# Patient Record
Sex: FEMALE | Race: WHITE | Employment: OTHER | URBAN - METROPOLITAN AREA
[De-identification: names, ages, dates, MRNs, and addresses within clinical notes are randomized per-mention and may not be internally consistent; named-entity substitution may affect disease eponyms.]

---

## 2018-11-19 ENCOUNTER — OFFICE VISIT (OUTPATIENT)
Dept: FAMILY MEDICINE CLINIC | Age: 53
End: 2018-11-19
Payer: MEDICARE

## 2018-11-19 VITALS
HEART RATE: 105 BPM | DIASTOLIC BLOOD PRESSURE: 108 MMHG | WEIGHT: 149 LBS | SYSTOLIC BLOOD PRESSURE: 148 MMHG | OXYGEN SATURATION: 99 % | TEMPERATURE: 99.3 F | BODY MASS INDEX: 27.42 KG/M2 | HEIGHT: 62 IN

## 2018-11-19 DIAGNOSIS — Z23 NEED FOR SHINGLES VACCINE: ICD-10-CM

## 2018-11-19 DIAGNOSIS — Z12.39 BREAST CANCER SCREENING: ICD-10-CM

## 2018-11-19 DIAGNOSIS — L98.9 FACIAL SKIN LESION: ICD-10-CM

## 2018-11-19 DIAGNOSIS — Z13.29 THYROID DISORDER SCREENING: ICD-10-CM

## 2018-11-19 DIAGNOSIS — F31.9 BIPOLAR DISEASE, CHRONIC (HCC): ICD-10-CM

## 2018-11-19 DIAGNOSIS — Z12.11 COLON CANCER SCREENING: ICD-10-CM

## 2018-11-19 DIAGNOSIS — Z13.220 SCREENING CHOLESTEROL LEVEL: Primary | ICD-10-CM

## 2018-11-19 DIAGNOSIS — F33.1 MODERATE EPISODE OF RECURRENT MAJOR DEPRESSIVE DISORDER (HCC): ICD-10-CM

## 2018-11-19 DIAGNOSIS — I10 ESSENTIAL HYPERTENSION: ICD-10-CM

## 2018-11-19 DIAGNOSIS — F43.10 PTSD (POST-TRAUMATIC STRESS DISORDER): ICD-10-CM

## 2018-11-19 LAB
A/G RATIO: 1.8 (ref 1.1–2.2)
ALBUMIN SERPL-MCNC: 5 G/DL (ref 3.4–5)
ALP BLD-CCNC: 95 U/L (ref 40–129)
ALT SERPL-CCNC: 15 U/L (ref 10–40)
ANION GAP SERPL CALCULATED.3IONS-SCNC: 20 MMOL/L (ref 3–16)
AST SERPL-CCNC: 17 U/L (ref 15–37)
BASOPHILS ABSOLUTE: 0 K/UL (ref 0–0.2)
BASOPHILS RELATIVE PERCENT: 0.2 %
BILIRUB SERPL-MCNC: 0.3 MG/DL (ref 0–1)
BUN BLDV-MCNC: 9 MG/DL (ref 7–20)
CALCIUM SERPL-MCNC: 10 MG/DL (ref 8.3–10.6)
CHLORIDE BLD-SCNC: 98 MMOL/L (ref 99–110)
CHOLESTEROL, TOTAL: 237 MG/DL (ref 0–199)
CO2: 22 MMOL/L (ref 21–32)
CREAT SERPL-MCNC: 0.7 MG/DL (ref 0.6–1.1)
EOSINOPHILS ABSOLUTE: 0.1 K/UL (ref 0–0.6)
EOSINOPHILS RELATIVE PERCENT: 0.8 %
GFR AFRICAN AMERICAN: >60
GFR NON-AFRICAN AMERICAN: >60
GLOBULIN: 2.8 G/DL
GLUCOSE BLD-MCNC: 95 MG/DL (ref 70–99)
HCT VFR BLD CALC: 42.5 % (ref 36–48)
HDLC SERPL-MCNC: 49 MG/DL (ref 40–60)
HEMOGLOBIN: 14.5 G/DL (ref 12–16)
LDL CHOLESTEROL CALCULATED: ABNORMAL MG/DL
LDL CHOLESTEROL DIRECT: 143 MG/DL
LYMPHOCYTES ABSOLUTE: 1.3 K/UL (ref 1–5.1)
LYMPHOCYTES RELATIVE PERCENT: 13.6 %
MCH RBC QN AUTO: 32.1 PG (ref 26–34)
MCHC RBC AUTO-ENTMCNC: 34.1 G/DL (ref 31–36)
MCV RBC AUTO: 94.2 FL (ref 80–100)
MONOCYTES ABSOLUTE: 0.8 K/UL (ref 0–1.3)
MONOCYTES RELATIVE PERCENT: 8.3 %
NEUTROPHILS ABSOLUTE: 7.2 K/UL (ref 1.7–7.7)
NEUTROPHILS RELATIVE PERCENT: 77.1 %
PDW BLD-RTO: 12.9 % (ref 12.4–15.4)
PLATELET # BLD: 277 K/UL (ref 135–450)
PMV BLD AUTO: 9 FL (ref 5–10.5)
POTASSIUM SERPL-SCNC: 4.1 MMOL/L (ref 3.5–5.1)
RBC # BLD: 4.51 M/UL (ref 4–5.2)
SODIUM BLD-SCNC: 140 MMOL/L (ref 136–145)
T4 FREE: 1.1 NG/DL (ref 0.9–1.8)
TOTAL PROTEIN: 7.8 G/DL (ref 6.4–8.2)
TRIGL SERPL-MCNC: 519 MG/DL (ref 0–150)
TSH REFLEX: 4.88 UIU/ML (ref 0.27–4.2)
VLDLC SERPL CALC-MCNC: ABNORMAL MG/DL
WBC # BLD: 9.3 K/UL (ref 4–11)

## 2018-11-19 PROCEDURE — 36415 COLL VENOUS BLD VENIPUNCTURE: CPT | Performed by: NURSE PRACTITIONER

## 2018-11-19 PROCEDURE — G8484 FLU IMMUNIZE NO ADMIN: HCPCS | Performed by: NURSE PRACTITIONER

## 2018-11-19 PROCEDURE — 4004F PT TOBACCO SCREEN RCVD TLK: CPT | Performed by: NURSE PRACTITIONER

## 2018-11-19 PROCEDURE — 3017F COLORECTAL CA SCREEN DOC REV: CPT | Performed by: NURSE PRACTITIONER

## 2018-11-19 PROCEDURE — G8427 DOCREV CUR MEDS BY ELIG CLIN: HCPCS | Performed by: NURSE PRACTITIONER

## 2018-11-19 PROCEDURE — G8419 CALC BMI OUT NRM PARAM NOF/U: HCPCS | Performed by: NURSE PRACTITIONER

## 2018-11-19 PROCEDURE — 99203 OFFICE O/P NEW LOW 30 MIN: CPT | Performed by: NURSE PRACTITIONER

## 2018-11-19 RX ORDER — LISINOPRIL 10 MG/1
10 TABLET ORAL DAILY
Qty: 30 TABLET | Refills: 0 | Status: SHIPPED | OUTPATIENT
Start: 2018-11-19 | End: 2018-12-16 | Stop reason: SDUPTHER

## 2018-11-19 RX ORDER — QUETIAPINE FUMARATE 300 MG/1
100 TABLET, FILM COATED ORAL DAILY
Status: ON HOLD | COMMUNITY
End: 2019-04-01

## 2018-11-19 RX ORDER — VENLAFAXINE 75 MG/1
75 TABLET ORAL 2 TIMES DAILY
COMMUNITY
End: 2019-04-26 | Stop reason: ALTCHOICE

## 2018-11-19 NOTE — PATIENT INSTRUCTIONS
Please read the healthy family handout that you were given and share it with your family. Please compare this printed medication list with your medications at home to be sure they are the same. If you have any medications that are different please contact us immediately at [phone]. Also review your allergies that we have listed, these may also include medications that you have not been able to tolerate, make sure everything listed is correct. If you have any allergies that are different please contact us immediately at [phone]. Patient Education        Stopping Smoking: Care Instructions  Your Care Instructions  Cigarette smokers crave the nicotine in cigarettes. Giving it up is much harder than simply changing a habit. Your body has to stop craving the nicotine. It is hard to quit, but you can do it. There are many tools that people use to quit smoking. You may find that combining tools works best for you. There are several steps to quitting. First you get ready to quit. Then you get support to help you. After that, you learn new skills and behaviors to become a nonsmoker. For many people, a necessary step is getting and using medicine. Your doctor will help you set up the plan that best meets your needs. You may want to attend a smoking cessation program to help you quit smoking. When you choose a program, look for one that has proven success. Ask your doctor for ideas. You will greatly increase your chances of success if you take medicine as well as get counseling or join a cessation program.  Some of the changes you feel when you first quit tobacco are uncomfortable. Your body will miss the nicotine at first, and you may feel short-tempered and grumpy. You may have trouble sleeping or concentrating. Medicine can help you deal with these symptoms. You may struggle with changing your smoking habits and rituals. The last step is the tricky one:  Be prepared for the smoking urge to continue for a

## 2018-12-03 ENCOUNTER — TELEPHONE (OUTPATIENT)
Dept: FAMILY MEDICINE CLINIC | Age: 53
End: 2018-12-03

## 2018-12-03 ENCOUNTER — NURSE ONLY (OUTPATIENT)
Dept: FAMILY MEDICINE CLINIC | Age: 53
End: 2018-12-03

## 2018-12-16 DIAGNOSIS — I10 ESSENTIAL HYPERTENSION: ICD-10-CM

## 2018-12-17 RX ORDER — LISINOPRIL 10 MG/1
TABLET ORAL
Qty: 30 TABLET | Refills: 5 | Status: SHIPPED | OUTPATIENT
Start: 2018-12-17 | End: 2019-07-03 | Stop reason: SDUPTHER

## 2019-03-05 ENCOUNTER — TELEPHONE (OUTPATIENT)
Dept: FAMILY MEDICINE CLINIC | Age: 54
End: 2019-03-05

## 2019-03-19 ENCOUNTER — OFFICE VISIT (OUTPATIENT)
Dept: FAMILY MEDICINE CLINIC | Age: 54
End: 2019-03-19
Payer: MEDICARE

## 2019-03-19 VITALS
DIASTOLIC BLOOD PRESSURE: 78 MMHG | SYSTOLIC BLOOD PRESSURE: 112 MMHG | TEMPERATURE: 98.1 F | OXYGEN SATURATION: 100 % | WEIGHT: 149 LBS | HEART RATE: 123 BPM | BODY MASS INDEX: 27.7 KG/M2

## 2019-03-19 DIAGNOSIS — E78.2 MIXED HYPERLIPIDEMIA: ICD-10-CM

## 2019-03-19 DIAGNOSIS — I10 ESSENTIAL HYPERTENSION: Primary | ICD-10-CM

## 2019-03-19 DIAGNOSIS — R53.83 FATIGUE, UNSPECIFIED TYPE: ICD-10-CM

## 2019-03-19 DIAGNOSIS — R00.2 PALPITATIONS: ICD-10-CM

## 2019-03-19 LAB
ANION GAP SERPL CALCULATED.3IONS-SCNC: 17 MMOL/L (ref 3–16)
BASOPHILS ABSOLUTE: 0 K/UL (ref 0–0.2)
BASOPHILS RELATIVE PERCENT: 0.3 %
BUN BLDV-MCNC: 15 MG/DL (ref 7–20)
CALCIUM SERPL-MCNC: 11.1 MG/DL (ref 8.3–10.6)
CHLORIDE BLD-SCNC: 96 MMOL/L (ref 99–110)
CHOLESTEROL, TOTAL: 222 MG/DL (ref 0–199)
CO2: 26 MMOL/L (ref 21–32)
CREAT SERPL-MCNC: 1.1 MG/DL (ref 0.6–1.1)
EOSINOPHILS ABSOLUTE: 0.1 K/UL (ref 0–0.6)
EOSINOPHILS RELATIVE PERCENT: 1.4 %
GFR AFRICAN AMERICAN: >60
GFR NON-AFRICAN AMERICAN: 52
GLUCOSE BLD-MCNC: 99 MG/DL (ref 70–99)
HCT VFR BLD CALC: 42 % (ref 36–48)
HDLC SERPL-MCNC: 45 MG/DL (ref 40–60)
HEMOGLOBIN: 14 G/DL (ref 12–16)
LDL CHOLESTEROL CALCULATED: ABNORMAL MG/DL
LDL CHOLESTEROL DIRECT: 143 MG/DL
LYMPHOCYTES ABSOLUTE: 1.4 K/UL (ref 1–5.1)
LYMPHOCYTES RELATIVE PERCENT: 19.1 %
MCH RBC QN AUTO: 31.3 PG (ref 26–34)
MCHC RBC AUTO-ENTMCNC: 33.4 G/DL (ref 31–36)
MCV RBC AUTO: 93.8 FL (ref 80–100)
MONOCYTES ABSOLUTE: 0.7 K/UL (ref 0–1.3)
MONOCYTES RELATIVE PERCENT: 9 %
NEUTROPHILS ABSOLUTE: 5.3 K/UL (ref 1.7–7.7)
NEUTROPHILS RELATIVE PERCENT: 70.2 %
PDW BLD-RTO: 13.6 % (ref 12.4–15.4)
PLATELET # BLD: 368 K/UL (ref 135–450)
PMV BLD AUTO: 9 FL (ref 5–10.5)
POTASSIUM SERPL-SCNC: 4.6 MMOL/L (ref 3.5–5.1)
RBC # BLD: 4.47 M/UL (ref 4–5.2)
SODIUM BLD-SCNC: 139 MMOL/L (ref 136–145)
T4 FREE: 1 NG/DL (ref 0.9–1.8)
TRIGL SERPL-MCNC: 363 MG/DL (ref 0–150)
TSH REFLEX: 4.6 UIU/ML (ref 0.27–4.2)
VLDLC SERPL CALC-MCNC: ABNORMAL MG/DL
WBC # BLD: 7.5 K/UL (ref 4–11)

## 2019-03-19 PROCEDURE — 99214 OFFICE O/P EST MOD 30 MIN: CPT | Performed by: NURSE PRACTITIONER

## 2019-03-19 PROCEDURE — 93000 ELECTROCARDIOGRAM COMPLETE: CPT | Performed by: NURSE PRACTITIONER

## 2019-03-19 PROCEDURE — G8419 CALC BMI OUT NRM PARAM NOF/U: HCPCS | Performed by: NURSE PRACTITIONER

## 2019-03-19 PROCEDURE — G8484 FLU IMMUNIZE NO ADMIN: HCPCS | Performed by: NURSE PRACTITIONER

## 2019-03-19 PROCEDURE — 4004F PT TOBACCO SCREEN RCVD TLK: CPT | Performed by: NURSE PRACTITIONER

## 2019-03-19 PROCEDURE — 36415 COLL VENOUS BLD VENIPUNCTURE: CPT | Performed by: NURSE PRACTITIONER

## 2019-03-19 PROCEDURE — 3017F COLORECTAL CA SCREEN DOC REV: CPT | Performed by: NURSE PRACTITIONER

## 2019-03-19 PROCEDURE — G8427 DOCREV CUR MEDS BY ELIG CLIN: HCPCS | Performed by: NURSE PRACTITIONER

## 2019-03-19 RX ORDER — MIRTAZAPINE 7.5 MG/1
TABLET, FILM COATED ORAL
Refills: 2 | COMMUNITY
Start: 2019-03-01 | End: 2019-04-26 | Stop reason: ALTCHOICE

## 2019-03-20 DIAGNOSIS — E03.9 HYPOTHYROIDISM, UNSPECIFIED TYPE: Primary | ICD-10-CM

## 2019-03-20 RX ORDER — LEVOTHYROXINE SODIUM 0.03 MG/1
25 TABLET ORAL DAILY
Qty: 30 TABLET | Refills: 5 | Status: SHIPPED | OUTPATIENT
Start: 2019-03-20

## 2019-03-28 ENCOUNTER — TELEPHONE (OUTPATIENT)
Dept: CARDIOLOGY CLINIC | Age: 54
End: 2019-03-28

## 2019-03-28 ENCOUNTER — OFFICE VISIT (OUTPATIENT)
Dept: CARDIOLOGY CLINIC | Age: 54
End: 2019-03-28
Payer: MEDICARE

## 2019-03-28 VITALS
HEIGHT: 62 IN | SYSTOLIC BLOOD PRESSURE: 111 MMHG | BODY MASS INDEX: 27.75 KG/M2 | WEIGHT: 150.8 LBS | OXYGEN SATURATION: 99 % | HEART RATE: 83 BPM | DIASTOLIC BLOOD PRESSURE: 76 MMHG

## 2019-03-28 DIAGNOSIS — R00.2 PALPITATIONS: ICD-10-CM

## 2019-03-28 DIAGNOSIS — R42 DIZZINESS: ICD-10-CM

## 2019-03-28 DIAGNOSIS — R07.2 PRECORDIAL PAIN: ICD-10-CM

## 2019-03-28 PROCEDURE — 99204 OFFICE O/P NEW MOD 45 MIN: CPT | Performed by: INTERNAL MEDICINE

## 2019-03-28 PROCEDURE — G8419 CALC BMI OUT NRM PARAM NOF/U: HCPCS | Performed by: INTERNAL MEDICINE

## 2019-03-28 PROCEDURE — G8484 FLU IMMUNIZE NO ADMIN: HCPCS | Performed by: INTERNAL MEDICINE

## 2019-03-28 PROCEDURE — 3017F COLORECTAL CA SCREEN DOC REV: CPT | Performed by: INTERNAL MEDICINE

## 2019-03-28 PROCEDURE — G8427 DOCREV CUR MEDS BY ELIG CLIN: HCPCS | Performed by: INTERNAL MEDICINE

## 2019-03-28 RX ORDER — QUETIAPINE FUMARATE 50 MG/1
200 TABLET, FILM COATED ORAL NIGHTLY
Status: ON HOLD | COMMUNITY
End: 2019-04-01

## 2019-03-28 NOTE — TELEPHONE ENCOUNTER
Patient seen at the [place] office to day.  Please call patient to schedule stress echocardiogram.

## 2019-03-29 ENCOUNTER — TELEPHONE (OUTPATIENT)
Dept: CARDIOLOGY CLINIC | Age: 54
End: 2019-03-29

## 2019-03-31 ENCOUNTER — HOSPITAL ENCOUNTER (INPATIENT)
Age: 54
LOS: 1 days | Discharge: HOME | DRG: 854 | End: 2019-04-02
Attending: EMERGENCY MEDICINE | Admitting: INTERNAL MEDICINE
Payer: MEDICARE

## 2019-03-31 ENCOUNTER — APPOINTMENT (OUTPATIENT)
Dept: CT IMAGING | Age: 54
DRG: 854 | End: 2019-03-31
Payer: MEDICARE

## 2019-03-31 DIAGNOSIS — R10.9 RIGHT FLANK PAIN: ICD-10-CM

## 2019-03-31 DIAGNOSIS — N17.9 AKI (ACUTE KIDNEY INJURY) (HCC): ICD-10-CM

## 2019-03-31 DIAGNOSIS — N13.30 HYDRONEPHROSIS, UNSPECIFIED HYDRONEPHROSIS TYPE: ICD-10-CM

## 2019-03-31 DIAGNOSIS — N13.2 HYDRONEPHROSIS WITH URINARY OBSTRUCTION DUE TO URETERAL CALCULUS: ICD-10-CM

## 2019-03-31 DIAGNOSIS — N20.0 KIDNEY STONE: ICD-10-CM

## 2019-03-31 DIAGNOSIS — A41.9 SEPTICEMIA (HCC): Primary | ICD-10-CM

## 2019-03-31 LAB
A/G RATIO: 1 (ref 1.1–2.2)
ALBUMIN SERPL-MCNC: 3.4 G/DL (ref 3.4–5)
ALP BLD-CCNC: 122 U/L (ref 40–129)
ALT SERPL-CCNC: 45 U/L (ref 10–40)
ANION GAP SERPL CALCULATED.3IONS-SCNC: 17 MMOL/L (ref 3–16)
ANISOCYTOSIS: ABNORMAL
AST SERPL-CCNC: 86 U/L (ref 15–37)
BANDED NEUTROPHILS RELATIVE PERCENT: 11 % (ref 0–7)
BASOPHILS ABSOLUTE: 0 K/UL (ref 0–0.2)
BASOPHILS RELATIVE PERCENT: 0 %
BILIRUB SERPL-MCNC: 0.9 MG/DL (ref 0–1)
BILIRUBIN URINE: NEGATIVE
BLOOD, URINE: ABNORMAL
BUN BLDV-MCNC: 17 MG/DL (ref 7–20)
CALCIUM SERPL-MCNC: 8.7 MG/DL (ref 8.3–10.6)
CHLORIDE BLD-SCNC: 97 MMOL/L (ref 99–110)
CLARITY: CLEAR
CO2: 21 MMOL/L (ref 21–32)
COLOR: YELLOW
CREAT SERPL-MCNC: 1.4 MG/DL (ref 0.6–1.1)
EOSINOPHILS ABSOLUTE: 0 K/UL (ref 0–0.6)
EOSINOPHILS RELATIVE PERCENT: 0 %
GFR AFRICAN AMERICAN: 48
GFR NON-AFRICAN AMERICAN: 40
GLOBULIN: 3.3 G/DL
GLUCOSE BLD-MCNC: 124 MG/DL (ref 70–99)
GLUCOSE URINE: NEGATIVE MG/DL
HCT VFR BLD CALC: 41.9 % (ref 36–48)
HEMOGLOBIN: 14.3 G/DL (ref 12–16)
KETONES, URINE: NEGATIVE MG/DL
LEUKOCYTE ESTERASE, URINE: ABNORMAL
LYMPHOCYTES ABSOLUTE: 0.4 K/UL (ref 1–5.1)
LYMPHOCYTES RELATIVE PERCENT: 2 %
MCH RBC QN AUTO: 30.9 PG (ref 26–34)
MCHC RBC AUTO-ENTMCNC: 34.1 G/DL (ref 31–36)
MCV RBC AUTO: 90.8 FL (ref 80–100)
METAMYELOCYTES RELATIVE PERCENT: 6 %
MICROSCOPIC EXAMINATION: YES
MONOCYTES ABSOLUTE: 0.6 K/UL (ref 0–1.3)
MONOCYTES RELATIVE PERCENT: 3 %
MYELOCYTE PERCENT: 1 %
NEUTROPHILS ABSOLUTE: 18.6 K/UL (ref 1.7–7.7)
NEUTROPHILS RELATIVE PERCENT: 77 %
NITRITE, URINE: NEGATIVE
PDW BLD-RTO: 14.1 % (ref 12.4–15.4)
PH UA: 6.5 (ref 5–8)
PLATELET # BLD: 198 K/UL (ref 135–450)
PMV BLD AUTO: 7.9 FL (ref 5–10.5)
POIKILOCYTES: ABNORMAL
POTASSIUM SERPL-SCNC: 3.7 MMOL/L (ref 3.5–5.1)
PROTEIN UA: 100 MG/DL
RBC # BLD: 4.61 M/UL (ref 4–5.2)
RBC UA: ABNORMAL /HPF (ref 0–2)
SLIDE REVIEW: ABNORMAL
SODIUM BLD-SCNC: 135 MMOL/L (ref 136–145)
SPECIFIC GRAVITY UA: 1.02 (ref 1–1.03)
TOTAL PROTEIN: 6.7 G/DL (ref 6.4–8.2)
URINE REFLEX TO CULTURE: YES
URINE TYPE: ABNORMAL
UROBILINOGEN, URINE: 0.2 E.U./DL
WBC # BLD: 19.6 K/UL (ref 4–11)
WBC UA: >100 /HPF (ref 0–5)

## 2019-03-31 PROCEDURE — 99285 EMERGENCY DEPT VISIT HI MDM: CPT

## 2019-03-31 PROCEDURE — 6360000002 HC RX W HCPCS: Performed by: PHYSICIAN ASSISTANT

## 2019-03-31 PROCEDURE — 96375 TX/PRO/DX INJ NEW DRUG ADDON: CPT

## 2019-03-31 PROCEDURE — 87186 SC STD MICRODIL/AGAR DIL: CPT

## 2019-03-31 PROCEDURE — 2500000003 HC RX 250 WO HCPCS: Performed by: PHYSICIAN ASSISTANT

## 2019-03-31 PROCEDURE — 96365 THER/PROPH/DIAG IV INF INIT: CPT

## 2019-03-31 PROCEDURE — 85025 COMPLETE CBC W/AUTO DIFF WBC: CPT

## 2019-03-31 PROCEDURE — 74176 CT ABD & PELVIS W/O CONTRAST: CPT

## 2019-03-31 PROCEDURE — 96361 HYDRATE IV INFUSION ADD-ON: CPT

## 2019-03-31 PROCEDURE — 87077 CULTURE AEROBIC IDENTIFY: CPT

## 2019-03-31 PROCEDURE — 80053 COMPREHEN METABOLIC PANEL: CPT

## 2019-03-31 PROCEDURE — 87086 URINE CULTURE/COLONY COUNT: CPT

## 2019-03-31 PROCEDURE — 81001 URINALYSIS AUTO W/SCOPE: CPT

## 2019-03-31 PROCEDURE — 2580000003 HC RX 258: Performed by: PHYSICIAN ASSISTANT

## 2019-03-31 RX ORDER — 0.9 % SODIUM CHLORIDE 0.9 %
1000 INTRAVENOUS SOLUTION INTRAVENOUS ONCE
Status: COMPLETED | OUTPATIENT
Start: 2019-03-31 | End: 2019-03-31

## 2019-03-31 RX ORDER — 0.9 % SODIUM CHLORIDE 0.9 %
1000 INTRAVENOUS SOLUTION INTRAVENOUS ONCE
Status: COMPLETED | OUTPATIENT
Start: 2019-03-31 | End: 2019-04-01

## 2019-03-31 RX ORDER — KETOROLAC TROMETHAMINE 30 MG/ML
30 INJECTION, SOLUTION INTRAMUSCULAR; INTRAVENOUS ONCE
Status: COMPLETED | OUTPATIENT
Start: 2019-03-31 | End: 2019-03-31

## 2019-03-31 RX ORDER — ONDANSETRON 2 MG/ML
4 INJECTION INTRAMUSCULAR; INTRAVENOUS ONCE
Status: COMPLETED | OUTPATIENT
Start: 2019-03-31 | End: 2019-03-31

## 2019-03-31 RX ADMIN — CEFTRIAXONE SODIUM 1 G: 1 INJECTION, POWDER, FOR SOLUTION INTRAMUSCULAR; INTRAVENOUS at 23:58

## 2019-03-31 RX ADMIN — FAMOTIDINE 20 MG: 10 INJECTION, SOLUTION INTRAVENOUS at 22:40

## 2019-03-31 RX ADMIN — ONDANSETRON 4 MG: 2 INJECTION INTRAMUSCULAR; INTRAVENOUS at 22:40

## 2019-03-31 RX ADMIN — KETOROLAC TROMETHAMINE 30 MG: 30 INJECTION, SOLUTION INTRAMUSCULAR; INTRAVENOUS at 22:40

## 2019-03-31 RX ADMIN — SODIUM CHLORIDE 1000 ML: 9 INJECTION, SOLUTION INTRAVENOUS at 22:10

## 2019-03-31 RX ADMIN — SODIUM CHLORIDE 1000 ML: 9 INJECTION, SOLUTION INTRAVENOUS at 22:40

## 2019-03-31 ASSESSMENT — PAIN DESCRIPTION - PAIN TYPE: TYPE: ACUTE PAIN

## 2019-03-31 ASSESSMENT — PAIN DESCRIPTION - FREQUENCY: FREQUENCY: CONTINUOUS

## 2019-03-31 ASSESSMENT — PAIN DESCRIPTION - DESCRIPTORS: DESCRIPTORS: ACHING

## 2019-03-31 ASSESSMENT — PAIN DESCRIPTION - LOCATION: LOCATION: FLANK

## 2019-03-31 ASSESSMENT — PAIN SCALES - GENERAL: PAINLEVEL_OUTOF10: 9

## 2019-03-31 ASSESSMENT — PAIN DESCRIPTION - ORIENTATION: ORIENTATION: RIGHT

## 2019-04-01 ENCOUNTER — APPOINTMENT (OUTPATIENT)
Dept: GENERAL RADIOLOGY | Age: 54
DRG: 854 | End: 2019-04-01
Payer: MEDICARE

## 2019-04-01 ENCOUNTER — ANESTHESIA (OUTPATIENT)
Dept: OPERATING ROOM | Age: 54
DRG: 854 | End: 2019-04-01
Payer: MEDICARE

## 2019-04-01 ENCOUNTER — ANESTHESIA EVENT (OUTPATIENT)
Dept: OPERATING ROOM | Age: 54
DRG: 854 | End: 2019-04-01
Payer: MEDICARE

## 2019-04-01 VITALS
RESPIRATION RATE: 13 BRPM | SYSTOLIC BLOOD PRESSURE: 92 MMHG | OXYGEN SATURATION: 100 % | DIASTOLIC BLOOD PRESSURE: 68 MMHG

## 2019-04-01 PROBLEM — N13.2 HYDRONEPHROSIS WITH URINARY OBSTRUCTION DUE TO URETERAL CALCULUS: Status: ACTIVE | Noted: 2019-04-01

## 2019-04-01 LAB
A/G RATIO: 0.9 (ref 1.1–2.2)
ALBUMIN SERPL-MCNC: 2.6 G/DL (ref 3.4–5)
ALP BLD-CCNC: 102 U/L (ref 40–129)
ALT SERPL-CCNC: 39 U/L (ref 10–40)
ANION GAP SERPL CALCULATED.3IONS-SCNC: 13 MMOL/L (ref 3–16)
AST SERPL-CCNC: 72 U/L (ref 15–37)
BILIRUB SERPL-MCNC: 0.4 MG/DL (ref 0–1)
BUN BLDV-MCNC: 19 MG/DL (ref 7–20)
CALCIUM SERPL-MCNC: 6.9 MG/DL (ref 8.3–10.6)
CHLORIDE BLD-SCNC: 106 MMOL/L (ref 99–110)
CO2: 19 MMOL/L (ref 21–32)
CREAT SERPL-MCNC: 1.2 MG/DL (ref 0.6–1.1)
GFR AFRICAN AMERICAN: 57
GFR NON-AFRICAN AMERICAN: 47
GLOBULIN: 2.8 G/DL
GLUCOSE BLD-MCNC: 115 MG/DL (ref 70–99)
INR BLD: 1.65 (ref 0.86–1.14)
LACTIC ACID: 2.1 MMOL/L (ref 0.4–2)
LACTIC ACID: 2.7 MMOL/L (ref 0.4–2)
LACTIC ACID: 2.7 MMOL/L (ref 0.4–2)
POTASSIUM REFLEX MAGNESIUM: 4.1 MMOL/L (ref 3.5–5.1)
PROTHROMBIN TIME: 18.8 SEC (ref 9.8–13)
SODIUM BLD-SCNC: 138 MMOL/L (ref 136–145)
TOTAL PROTEIN: 5.4 G/DL (ref 6.4–8.2)

## 2019-04-01 PROCEDURE — 6360000002 HC RX W HCPCS: Performed by: NURSE ANESTHETIST, CERTIFIED REGISTERED

## 2019-04-01 PROCEDURE — 6360000002 HC RX W HCPCS: Performed by: UROLOGY

## 2019-04-01 PROCEDURE — C1769 GUIDE WIRE: HCPCS | Performed by: UROLOGY

## 2019-04-01 PROCEDURE — 2580000003 HC RX 258: Performed by: NURSE ANESTHETIST, CERTIFIED REGISTERED

## 2019-04-01 PROCEDURE — 3600000004 HC SURGERY LEVEL 4 BASE: Performed by: UROLOGY

## 2019-04-01 PROCEDURE — C1773 RET DEV, INSERTABLE: HCPCS | Performed by: UROLOGY

## 2019-04-01 PROCEDURE — 6360000002 HC RX W HCPCS: Performed by: ANESTHESIOLOGY

## 2019-04-01 PROCEDURE — 1200000000 HC SEMI PRIVATE

## 2019-04-01 PROCEDURE — 2500000003 HC RX 250 WO HCPCS: Performed by: NURSE ANESTHETIST, CERTIFIED REGISTERED

## 2019-04-01 PROCEDURE — 6370000000 HC RX 637 (ALT 250 FOR IP): Performed by: INTERNAL MEDICINE

## 2019-04-01 PROCEDURE — 3700000000 HC ANESTHESIA ATTENDED CARE: Performed by: UROLOGY

## 2019-04-01 PROCEDURE — 7100000000 HC PACU RECOVERY - FIRST 15 MIN: Performed by: UROLOGY

## 2019-04-01 PROCEDURE — 3600000014 HC SURGERY LEVEL 4 ADDTL 15MIN: Performed by: UROLOGY

## 2019-04-01 PROCEDURE — 6360000002 HC RX W HCPCS: Performed by: PHYSICIAN ASSISTANT

## 2019-04-01 PROCEDURE — 83605 ASSAY OF LACTIC ACID: CPT

## 2019-04-01 PROCEDURE — 87040 BLOOD CULTURE FOR BACTERIA: CPT

## 2019-04-01 PROCEDURE — 74420 UROGRAPHY RTRGR +-KUB: CPT

## 2019-04-01 PROCEDURE — 6370000000 HC RX 637 (ALT 250 FOR IP): Performed by: UROLOGY

## 2019-04-01 PROCEDURE — 82365 CALCULUS SPECTROSCOPY: CPT

## 2019-04-01 PROCEDURE — 2580000003 HC RX 258: Performed by: UROLOGY

## 2019-04-01 PROCEDURE — 80053 COMPREHEN METABOLIC PANEL: CPT

## 2019-04-01 PROCEDURE — 2709999900 HC NON-CHARGEABLE SUPPLY: Performed by: UROLOGY

## 2019-04-01 PROCEDURE — 6360000002 HC RX W HCPCS: Performed by: INTERNAL MEDICINE

## 2019-04-01 PROCEDURE — 0TC78ZZ EXTIRPATION OF MATTER FROM LEFT URETER, VIA NATURAL OR ARTIFICIAL OPENING ENDOSCOPIC: ICD-10-PCS | Performed by: UROLOGY

## 2019-04-01 PROCEDURE — 36415 COLL VENOUS BLD VENIPUNCTURE: CPT

## 2019-04-01 PROCEDURE — 88300 SURGICAL PATH GROSS: CPT

## 2019-04-01 PROCEDURE — 7100000001 HC PACU RECOVERY - ADDTL 15 MIN: Performed by: UROLOGY

## 2019-04-01 PROCEDURE — 85610 PROTHROMBIN TIME: CPT

## 2019-04-01 PROCEDURE — C1758 CATHETER, URETERAL: HCPCS | Performed by: UROLOGY

## 2019-04-01 PROCEDURE — 2580000003 HC RX 258: Performed by: INTERNAL MEDICINE

## 2019-04-01 PROCEDURE — C2617 STENT, NON-COR, TEM W/O DEL: HCPCS | Performed by: UROLOGY

## 2019-04-01 PROCEDURE — 0T768DZ DILATION OF RIGHT URETER WITH INTRALUMINAL DEVICE, VIA NATURAL OR ARTIFICIAL OPENING ENDOSCOPIC: ICD-10-PCS | Performed by: UROLOGY

## 2019-04-01 PROCEDURE — 6370000000 HC RX 637 (ALT 250 FOR IP): Performed by: ANESTHESIOLOGY

## 2019-04-01 PROCEDURE — 3700000001 HC ADD 15 MINUTES (ANESTHESIA): Performed by: UROLOGY

## 2019-04-01 DEVICE — URETERAL STENT
Type: IMPLANTABLE DEVICE | Site: URETER | Status: FUNCTIONAL
Brand: CONTOUR™

## 2019-04-01 RX ORDER — QUETIAPINE FUMARATE 100 MG/1
50 TABLET, FILM COATED ORAL 2 TIMES DAILY
COMMUNITY

## 2019-04-01 RX ORDER — LABETALOL HYDROCHLORIDE 5 MG/ML
5 INJECTION, SOLUTION INTRAVENOUS EVERY 10 MIN PRN
Status: DISCONTINUED | OUTPATIENT
Start: 2019-04-01 | End: 2019-04-01 | Stop reason: HOSPADM

## 2019-04-01 RX ORDER — FENTANYL CITRATE 50 UG/ML
INJECTION, SOLUTION INTRAMUSCULAR; INTRAVENOUS PRN
Status: DISCONTINUED | OUTPATIENT
Start: 2019-04-01 | End: 2019-04-01 | Stop reason: SDUPTHER

## 2019-04-01 RX ORDER — HYDROMORPHONE HCL 110MG/55ML
1 PATIENT CONTROLLED ANALGESIA SYRINGE INTRAVENOUS
Status: DISCONTINUED | OUTPATIENT
Start: 2019-04-01 | End: 2019-04-01

## 2019-04-01 RX ORDER — QUETIAPINE FUMARATE 200 MG/1
200 TABLET, FILM COATED ORAL NIGHTLY
Status: DISCONTINUED | OUTPATIENT
Start: 2019-04-01 | End: 2019-04-02 | Stop reason: HOSPADM

## 2019-04-01 RX ORDER — 0.9 % SODIUM CHLORIDE 0.9 %
1000 INTRAVENOUS SOLUTION INTRAVENOUS ONCE
Status: COMPLETED | OUTPATIENT
Start: 2019-04-01 | End: 2019-04-01

## 2019-04-01 RX ORDER — ONDANSETRON 2 MG/ML
4 INJECTION INTRAMUSCULAR; INTRAVENOUS EVERY 6 HOURS PRN
Status: DISCONTINUED | OUTPATIENT
Start: 2019-04-01 | End: 2019-04-02 | Stop reason: HOSPADM

## 2019-04-01 RX ORDER — OXYCODONE HYDROCHLORIDE AND ACETAMINOPHEN 5; 325 MG/1; MG/1
2 TABLET ORAL PRN
Status: COMPLETED | OUTPATIENT
Start: 2019-04-01 | End: 2019-04-01

## 2019-04-01 RX ORDER — SODIUM CHLORIDE 0.9 % (FLUSH) 0.9 %
10 SYRINGE (ML) INJECTION PRN
Status: DISCONTINUED | OUTPATIENT
Start: 2019-04-01 | End: 2019-04-02 | Stop reason: HOSPADM

## 2019-04-01 RX ORDER — HYDROMORPHONE HCL 110MG/55ML
0.5 PATIENT CONTROLLED ANALGESIA SYRINGE INTRAVENOUS
Status: DISCONTINUED | OUTPATIENT
Start: 2019-04-01 | End: 2019-04-01

## 2019-04-01 RX ORDER — SODIUM CHLORIDE 0.9 % (FLUSH) 0.9 %
10 SYRINGE (ML) INJECTION EVERY 12 HOURS SCHEDULED
Status: DISCONTINUED | OUTPATIENT
Start: 2019-04-01 | End: 2019-04-02 | Stop reason: HOSPADM

## 2019-04-01 RX ORDER — SODIUM CHLORIDE 9 MG/ML
INJECTION, SOLUTION INTRAVENOUS CONTINUOUS PRN
Status: DISCONTINUED | OUTPATIENT
Start: 2019-04-01 | End: 2019-04-01 | Stop reason: SDUPTHER

## 2019-04-01 RX ORDER — HYDROMORPHONE HCL 110MG/55ML
0.25 PATIENT CONTROLLED ANALGESIA SYRINGE INTRAVENOUS EVERY 5 MIN PRN
Status: DISCONTINUED | OUTPATIENT
Start: 2019-04-01 | End: 2019-04-01 | Stop reason: HOSPADM

## 2019-04-01 RX ORDER — MAGNESIUM HYDROXIDE 1200 MG/15ML
LIQUID ORAL PRN
Status: DISCONTINUED | OUTPATIENT
Start: 2019-04-01 | End: 2019-04-01 | Stop reason: HOSPADM

## 2019-04-01 RX ORDER — SUCCINYLCHOLINE CHLORIDE 20 MG/ML
INJECTION INTRAMUSCULAR; INTRAVENOUS PRN
Status: DISCONTINUED | OUTPATIENT
Start: 2019-04-01 | End: 2019-04-01 | Stop reason: SDUPTHER

## 2019-04-01 RX ORDER — LIDOCAINE HYDROCHLORIDE 20 MG/ML
INJECTION, SOLUTION INFILTRATION; PERINEURAL PRN
Status: DISCONTINUED | OUTPATIENT
Start: 2019-04-01 | End: 2019-04-01 | Stop reason: SDUPTHER

## 2019-04-01 RX ORDER — 0.9 % SODIUM CHLORIDE 0.9 %
500 INTRAVENOUS SOLUTION INTRAVENOUS
Status: DISCONTINUED | OUTPATIENT
Start: 2019-04-01 | End: 2019-04-02 | Stop reason: HOSPADM

## 2019-04-01 RX ORDER — HYDROMORPHONE HCL 110MG/55ML
0.5 PATIENT CONTROLLED ANALGESIA SYRINGE INTRAVENOUS
Status: DISCONTINUED | OUTPATIENT
Start: 2019-04-01 | End: 2019-04-02 | Stop reason: HOSPADM

## 2019-04-01 RX ORDER — OXYCODONE HYDROCHLORIDE AND ACETAMINOPHEN 5; 325 MG/1; MG/1
1 TABLET ORAL PRN
Status: COMPLETED | OUTPATIENT
Start: 2019-04-01 | End: 2019-04-01

## 2019-04-01 RX ORDER — ACETAMINOPHEN 325 MG/1
650 TABLET ORAL EVERY 4 HOURS PRN
Status: DISCONTINUED | OUTPATIENT
Start: 2019-04-01 | End: 2019-04-02 | Stop reason: HOSPADM

## 2019-04-01 RX ORDER — MEPERIDINE HYDROCHLORIDE 25 MG/ML
12.5 INJECTION INTRAMUSCULAR; INTRAVENOUS; SUBCUTANEOUS EVERY 5 MIN PRN
Status: DISCONTINUED | OUTPATIENT
Start: 2019-04-01 | End: 2019-04-01 | Stop reason: HOSPADM

## 2019-04-01 RX ORDER — ULTRASOUND COUPLING MEDIUM
GEL (GRAM) TOPICAL PRN
Status: DISCONTINUED | OUTPATIENT
Start: 2019-04-01 | End: 2019-04-01 | Stop reason: HOSPADM

## 2019-04-01 RX ORDER — 0.9 % SODIUM CHLORIDE 0.9 %
30 INTRAVENOUS SOLUTION INTRAVENOUS ONCE
Status: DISCONTINUED | OUTPATIENT
Start: 2019-04-01 | End: 2019-04-01

## 2019-04-01 RX ORDER — PROPOFOL 10 MG/ML
INJECTION, EMULSION INTRAVENOUS PRN
Status: DISCONTINUED | OUTPATIENT
Start: 2019-04-01 | End: 2019-04-01 | Stop reason: SDUPTHER

## 2019-04-01 RX ORDER — LEVOTHYROXINE SODIUM 0.03 MG/1
25 TABLET ORAL DAILY
Status: DISCONTINUED | OUTPATIENT
Start: 2019-04-01 | End: 2019-04-02 | Stop reason: HOSPADM

## 2019-04-01 RX ORDER — DEXAMETHASONE SODIUM PHOSPHATE 4 MG/ML
INJECTION, SOLUTION INTRA-ARTICULAR; INTRALESIONAL; INTRAMUSCULAR; INTRAVENOUS; SOFT TISSUE PRN
Status: DISCONTINUED | OUTPATIENT
Start: 2019-04-01 | End: 2019-04-01 | Stop reason: SDUPTHER

## 2019-04-01 RX ORDER — QUETIAPINE FUMARATE 100 MG/1
100 TABLET, FILM COATED ORAL EVERY MORNING
Status: DISCONTINUED | OUTPATIENT
Start: 2019-04-02 | End: 2019-04-02 | Stop reason: HOSPADM

## 2019-04-01 RX ORDER — ONDANSETRON 2 MG/ML
INJECTION INTRAMUSCULAR; INTRAVENOUS PRN
Status: DISCONTINUED | OUTPATIENT
Start: 2019-04-01 | End: 2019-04-01 | Stop reason: SDUPTHER

## 2019-04-01 RX ORDER — MORPHINE SULFATE 10 MG/ML
1 INJECTION, SOLUTION INTRAMUSCULAR; INTRAVENOUS EVERY 5 MIN PRN
Status: DISCONTINUED | OUTPATIENT
Start: 2019-04-01 | End: 2019-04-01 | Stop reason: HOSPADM

## 2019-04-01 RX ORDER — HYDRALAZINE HYDROCHLORIDE 20 MG/ML
5 INJECTION INTRAMUSCULAR; INTRAVENOUS EVERY 10 MIN PRN
Status: DISCONTINUED | OUTPATIENT
Start: 2019-04-01 | End: 2019-04-01 | Stop reason: HOSPADM

## 2019-04-01 RX ORDER — MIRTAZAPINE 15 MG/1
7.5 TABLET, FILM COATED ORAL NIGHTLY
Status: DISCONTINUED | OUTPATIENT
Start: 2019-04-01 | End: 2019-04-01

## 2019-04-01 RX ORDER — HYDROMORPHONE HCL 110MG/55ML
0.25 PATIENT CONTROLLED ANALGESIA SYRINGE INTRAVENOUS
Status: DISCONTINUED | OUTPATIENT
Start: 2019-04-01 | End: 2019-04-02 | Stop reason: HOSPADM

## 2019-04-01 RX ORDER — VENLAFAXINE 37.5 MG/1
75 TABLET ORAL 2 TIMES DAILY
Status: DISCONTINUED | OUTPATIENT
Start: 2019-04-01 | End: 2019-04-01

## 2019-04-01 RX ORDER — MORPHINE SULFATE 10 MG/ML
2 INJECTION, SOLUTION INTRAMUSCULAR; INTRAVENOUS EVERY 5 MIN PRN
Status: DISCONTINUED | OUTPATIENT
Start: 2019-04-01 | End: 2019-04-01 | Stop reason: HOSPADM

## 2019-04-01 RX ORDER — QUETIAPINE FUMARATE 200 MG/1
200 TABLET, FILM COATED ORAL NIGHTLY
COMMUNITY

## 2019-04-01 RX ORDER — DIPHENHYDRAMINE HYDROCHLORIDE 50 MG/ML
12.5 INJECTION INTRAMUSCULAR; INTRAVENOUS
Status: DISCONTINUED | OUTPATIENT
Start: 2019-04-01 | End: 2019-04-01 | Stop reason: HOSPADM

## 2019-04-01 RX ORDER — HYDROMORPHONE HCL 110MG/55ML
1 PATIENT CONTROLLED ANALGESIA SYRINGE INTRAVENOUS ONCE
Status: COMPLETED | OUTPATIENT
Start: 2019-04-01 | End: 2019-04-01

## 2019-04-01 RX ORDER — SODIUM CHLORIDE 9 MG/ML
INJECTION, SOLUTION INTRAVENOUS CONTINUOUS
Status: DISCONTINUED | OUTPATIENT
Start: 2019-04-01 | End: 2019-04-02 | Stop reason: HOSPADM

## 2019-04-01 RX ORDER — HYDROMORPHONE HCL 110MG/55ML
0.5 PATIENT CONTROLLED ANALGESIA SYRINGE INTRAVENOUS EVERY 5 MIN PRN
Status: DISCONTINUED | OUTPATIENT
Start: 2019-04-01 | End: 2019-04-01 | Stop reason: HOSPADM

## 2019-04-01 RX ORDER — NICOTINE 21 MG/24HR
1 PATCH, TRANSDERMAL 24 HOURS TRANSDERMAL DAILY
Status: DISCONTINUED | OUTPATIENT
Start: 2019-04-01 | End: 2019-04-02 | Stop reason: HOSPADM

## 2019-04-01 RX ORDER — ONDANSETRON 2 MG/ML
4 INJECTION INTRAMUSCULAR; INTRAVENOUS
Status: COMPLETED | OUTPATIENT
Start: 2019-04-01 | End: 2019-04-01

## 2019-04-01 RX ORDER — ROCURONIUM BROMIDE 10 MG/ML
INJECTION, SOLUTION INTRAVENOUS PRN
Status: DISCONTINUED | OUTPATIENT
Start: 2019-04-01 | End: 2019-04-01 | Stop reason: SDUPTHER

## 2019-04-01 RX ORDER — PROMETHAZINE HYDROCHLORIDE 25 MG/ML
6.25 INJECTION, SOLUTION INTRAMUSCULAR; INTRAVENOUS
Status: DISCONTINUED | OUTPATIENT
Start: 2019-04-01 | End: 2019-04-01 | Stop reason: HOSPADM

## 2019-04-01 RX ADMIN — ONDANSETRON 4 MG: 2 INJECTION, SOLUTION INTRAMUSCULAR; INTRAVENOUS at 14:29

## 2019-04-01 RX ADMIN — FENTANYL CITRATE 50 MCG: 50 INJECTION INTRAMUSCULAR; INTRAVENOUS at 14:30

## 2019-04-01 RX ADMIN — SUGAMMADEX 100 MG: 100 INJECTION, SOLUTION INTRAVENOUS at 14:48

## 2019-04-01 RX ADMIN — SODIUM CHLORIDE: 9 INJECTION, SOLUTION INTRAVENOUS at 18:08

## 2019-04-01 RX ADMIN — HYDROMORPHONE HYDROCHLORIDE 0.25 MG: 2 INJECTION, SOLUTION INTRAMUSCULAR; INTRAVENOUS; SUBCUTANEOUS at 22:06

## 2019-04-01 RX ADMIN — HYDROMORPHONE HYDROCHLORIDE 0.25 MG: 2 INJECTION, SOLUTION INTRAMUSCULAR; INTRAVENOUS; SUBCUTANEOUS at 06:51

## 2019-04-01 RX ADMIN — SUCCINYLCHOLINE CHLORIDE 140 MG: 20 INJECTION, SOLUTION INTRAMUSCULAR; INTRAVENOUS at 14:19

## 2019-04-01 RX ADMIN — DEXAMETHASONE SODIUM PHOSPHATE 6 MG: 4 INJECTION, SOLUTION INTRAMUSCULAR; INTRAVENOUS at 14:29

## 2019-04-01 RX ADMIN — CEFTRIAXONE SODIUM 1 G: 1 INJECTION, POWDER, FOR SOLUTION INTRAMUSCULAR; INTRAVENOUS at 16:44

## 2019-04-01 RX ADMIN — ROCURONIUM BROMIDE 10 MG: 10 INJECTION, SOLUTION INTRAVENOUS at 14:19

## 2019-04-01 RX ADMIN — HYDROMORPHONE HYDROCHLORIDE 1 MG: 2 INJECTION, SOLUTION INTRAMUSCULAR; INTRAVENOUS; SUBCUTANEOUS at 00:30

## 2019-04-01 RX ADMIN — QUETIAPINE FUMARATE 200 MG: 200 TABLET ORAL at 21:57

## 2019-04-01 RX ADMIN — ENOXAPARIN SODIUM 40 MG: 100 INJECTION SUBCUTANEOUS at 21:56

## 2019-04-01 RX ADMIN — HYDROMORPHONE HYDROCHLORIDE 0.5 MG: 2 INJECTION, SOLUTION INTRAMUSCULAR; INTRAVENOUS; SUBCUTANEOUS at 10:22

## 2019-04-01 RX ADMIN — SODIUM CHLORIDE 1000 ML: 9 INJECTION, SOLUTION INTRAVENOUS at 03:30

## 2019-04-01 RX ADMIN — SODIUM CHLORIDE: 9 INJECTION, SOLUTION INTRAVENOUS at 14:14

## 2019-04-01 RX ADMIN — FENTANYL CITRATE 50 MCG: 50 INJECTION INTRAMUSCULAR; INTRAVENOUS at 14:14

## 2019-04-01 RX ADMIN — SODIUM CHLORIDE: 9 INJECTION, SOLUTION INTRAVENOUS at 02:02

## 2019-04-01 RX ADMIN — PROPOFOL 200 MG: 10 INJECTION, EMULSION INTRAVENOUS at 14:19

## 2019-04-01 RX ADMIN — SODIUM CHLORIDE: 9 INJECTION, SOLUTION INTRAVENOUS at 21:57

## 2019-04-01 RX ADMIN — SODIUM CHLORIDE: 9 INJECTION, SOLUTION INTRAVENOUS at 12:38

## 2019-04-01 RX ADMIN — CEFTRIAXONE SODIUM 1 G: 1 INJECTION, POWDER, FOR SOLUTION INTRAMUSCULAR; INTRAVENOUS at 01:46

## 2019-04-01 RX ADMIN — ONDANSETRON 4 MG: 2 INJECTION INTRAMUSCULAR; INTRAVENOUS at 15:06

## 2019-04-01 RX ADMIN — ONDANSETRON 4 MG: 2 INJECTION INTRAMUSCULAR; INTRAVENOUS at 23:14

## 2019-04-01 RX ADMIN — OXYCODONE AND ACETAMINOPHEN 2 TABLET: 5; 325 TABLET ORAL at 15:43

## 2019-04-01 RX ADMIN — HYDROMORPHONE HYDROCHLORIDE 0.5 MG: 2 INJECTION, SOLUTION INTRAMUSCULAR; INTRAVENOUS; SUBCUTANEOUS at 15:06

## 2019-04-01 RX ADMIN — LIDOCAINE HYDROCHLORIDE 20 MG: 20 INJECTION, SOLUTION INFILTRATION; PERINEURAL at 14:19

## 2019-04-01 RX ADMIN — LEVOTHYROXINE SODIUM 25 MCG: 25 TABLET ORAL at 09:11

## 2019-04-01 ASSESSMENT — PULMONARY FUNCTION TESTS
PIF_VALUE: 28
PIF_VALUE: 1
PIF_VALUE: 22
PIF_VALUE: 4
PIF_VALUE: 27
PIF_VALUE: 28
PIF_VALUE: 26
PIF_VALUE: 26
PIF_VALUE: 7
PIF_VALUE: 28
PIF_VALUE: 2
PIF_VALUE: 1
PIF_VALUE: 27
PIF_VALUE: 20
PIF_VALUE: 29
PIF_VALUE: 28
PIF_VALUE: 2
PIF_VALUE: 2
PIF_VALUE: 28
PIF_VALUE: 27
PIF_VALUE: 2
PIF_VALUE: 22
PIF_VALUE: 1
PIF_VALUE: 3
PIF_VALUE: 3
PIF_VALUE: 28
PIF_VALUE: 27
PIF_VALUE: 37
PIF_VALUE: 27
PIF_VALUE: 17
PIF_VALUE: 27
PIF_VALUE: 28

## 2019-04-01 ASSESSMENT — PAIN SCALES - GENERAL
PAINLEVEL_OUTOF10: 8
PAINLEVEL_OUTOF10: 7
PAINLEVEL_OUTOF10: 3
PAINLEVEL_OUTOF10: 6
PAINLEVEL_OUTOF10: 8
PAINLEVEL_OUTOF10: 5
PAINLEVEL_OUTOF10: 6
PAINLEVEL_OUTOF10: 6
PAINLEVEL_OUTOF10: 4
PAINLEVEL_OUTOF10: 4
PAINLEVEL_OUTOF10: 5
PAINLEVEL_OUTOF10: 4
PAINLEVEL_OUTOF10: 6
PAINLEVEL_OUTOF10: 6
PAINLEVEL_OUTOF10: 7

## 2019-04-01 ASSESSMENT — PAIN DESCRIPTION - FREQUENCY
FREQUENCY: INTERMITTENT
FREQUENCY: INTERMITTENT

## 2019-04-01 ASSESSMENT — PAIN DESCRIPTION - PAIN TYPE
TYPE: ACUTE PAIN

## 2019-04-01 ASSESSMENT — PAIN DESCRIPTION - PROGRESSION
CLINICAL_PROGRESSION: GRADUALLY IMPROVING
CLINICAL_PROGRESSION: GRADUALLY WORSENING
CLINICAL_PROGRESSION: GRADUALLY IMPROVING

## 2019-04-01 ASSESSMENT — PAIN DESCRIPTION - ORIENTATION: ORIENTATION: RIGHT

## 2019-04-01 ASSESSMENT — PAIN DESCRIPTION - LOCATION
LOCATION: BACK;ABDOMEN
LOCATION: BACK
LOCATION: ABDOMEN;BACK;FLANK
LOCATION: ABDOMEN;BACK;FLANK

## 2019-04-01 ASSESSMENT — PAIN DESCRIPTION - ONSET: ONSET: ON-GOING

## 2019-04-01 ASSESSMENT — LIFESTYLE VARIABLES: SMOKING_STATUS: 1

## 2019-04-01 ASSESSMENT — PAIN DESCRIPTION - DESCRIPTORS: DESCRIPTORS: ACHING;CRAMPING

## 2019-04-01 NOTE — CARE COORDINATION
Case Management Assessment  Initial Evaluation    Date/Time of Evaluation: 4/1/2019 9:10 AM  Assessment Completed by: Chantel Smith    Patient Name: Tano Pineda  YOB: 1965  Diagnosis: Hydronephrosis with urinary obstruction due to ureteral calculus [N13.2]  Date / Time: 3/31/2019  9:26 PM  Admission status/Date:inpatient 04/01/19  Chart Reviewed: Yes      Patient Interviewed: Yes   Family Interviewed:  No      Hospitalization in the last 30 days:  No    Contacts  :     Relationship to Patient:   Phone Number:    Alternate Contact:     Relationship to Patient:     Phone Number:    Met with:    Current PCP  Krysten Henriquez APRN - CNP      Financial  Commercial  Precert required for SNF : Y, N        3 night stay required - Y, N    ADLS  Support Systems: Children  Transportation: self    Meal Preparation: self    Housing  Home Environment: home alone  Steps: none  Plans to Return to Present Housing: Yes  Other Identified Issues: no    Home Care Information  Currently active with [facility] [address] : No  Type of Home Care Services: None  Passport/Waiver : No  :                      Phone Number:    Passport/Waiver Services: no          Durable Medical Equipment   DME Provider: n/a  Equipment: Walker__Cane__RTS__ BSC__Shower Chair__  02__ HHN__ CPAP__  BiPap__  Hospital Bed__ W/C___ Other__________      Has Home O2 in place on admit:  No  Informed of need to bring portable home O2 tank on day of discharge for nursing to connect prior to leaving:   Not Indicated  Verbalized agreement/Understanding:   Not Indicated    Community Service Affiliation  Dialysis:  No    · Name:  · Location  · Dialysis Schedule:  · Phone:   · Fax: Outpatient PT/OT: No    Cancer Center: No     CHF Clinic: No     Pulmonary Rehab: No  Pain Clinic: No  Community Mental Health: No    Wound Clinic: No     Other: no    DISCHARGE PLAN: Reviewed Chart. Met with the pt. Role of dcp explained.  Pt from

## 2019-04-01 NOTE — ED NOTES
0037~ Perfect Serve message sent to Dr. Laci Andrade  04/01/19 0038    0040~ Dr. Murlean Sacks returned call to [address] [place] [address].  04/01/19 0041

## 2019-04-01 NOTE — PROGRESS NOTES
New order for 1 liter NS bolus for low b/p. Pt aware of NPO for possible OR with urology.   Radha Laboy

## 2019-04-01 NOTE — ED NOTES
Pt to CT at this time. Pt stable upon transport. Will continue to monitor.       Hazel Wesely RN  03/31/19 7267

## 2019-04-01 NOTE — ED NOTES
Chief Complaint   Patient presents with    Flank Pain     pt states that she has had R flank pain x 2 days. pt states hx of kidney stones. pt reports n/v x 1 day. Pt arrived via ems from home. Pt placed in room 14. Bed in low position, call light in reach. Will continue to monitor.       Scott Lilly RN  03/31/19 9151

## 2019-04-01 NOTE — CONSULTS
4/1/2019  Omkar Oconnell    Reason for Consult:  Right flank pain  Requesting Physician:  Rosendo Alcocer      History Obtained From:  patient, electronic medical record    HISTORY OF PRESENT ILLNESS:                The patient is a 48 y.o. female who presents with right flank pain. She was seen in the ER and a CT showed a 5 mm stone in the distal right ureter with high grade obstruction. Past Medical History:        Diagnosis Date    Hypothyroid     Kidney stone     1-2 years ago    Thyroid disease      Past Surgical History:        Procedure Laterality Date    BACK SURGERY  2014    HYSTERECTOMY  2007     Current Medications:   Current Facility-Administered Medications: cefTRIAXone (ROCEPHIN) 1 g IVPB in 50 mL D5W minibag, 1 g, Intravenous, Q12H  levothyroxine (SYNTHROID) tablet 25 mcg, 25 mcg, Oral, Daily  sodium chloride flush 0.9 % injection 10 mL, 10 mL, Intravenous, 2 times per day  sodium chloride flush 0.9 % injection 10 mL, 10 mL, Intravenous, PRN  enoxaparin (LOVENOX) injection 40 mg, 40 mg, Subcutaneous, Daily  0.9 % sodium chloride infusion, , Intravenous, Continuous  acetaminophen (TYLENOL) tablet 650 mg, 650 mg, Oral, Q4H PRN  HYDROmorphone (DILAUDID) injection 0.25 mg, 0.25 mg, Intravenous, Q3H PRN **OR** HYDROmorphone (DILAUDID) injection 0.5 mg, 0.5 mg, Intravenous, Q3H PRN  nicotine (NICODERM CQ) 14 MG/24HR 1 patch, 1 patch, Transdermal, Daily  0.9 % sodium chloride bolus, 500 mL, Intravenous, Q1H PRN  Allergies:     Allergies   Allergen Reactions    Depakote [Valproic Acid] Rash     Social History:  Reviewed, non contributory    Family History:  Reviewed, non contributory      REVIEW OF SYSTEMS:    12 point ROS has been completed and reviewed    PHYSICAL EXAM:    VITALS:  /84   Pulse 113   Temp 98.8 °F (37.1 °C) (Oral)   Resp 18   Ht 5' 2\" (1.575 m)   Wt 155 lb 4.8 oz (70.4 kg)   LMP 01/01/2007 (Approximate)   SpO2 94%

## 2019-04-01 NOTE — BRIEF OP NOTE
Brief Postoperative Note  ______________________________________________________________    Patient: Lauren Last  YOB: 1965  MRN: [id number]  Date of Procedure: 4/1/2019    Pre-Op Diagnosis: DISTAL RIGHT STONE    Post-Op Diagnosis: Same       Procedure(s):  CYSTOSCOPY, RIGHT URETEROSCOPY, HOLMIUM LASER LITHOTRIPSY, RIGHT URETERAL STENT PLACEMENT    Anesthesia: General    Surgeon(s):  Alec Avila MD    Assistant:     Estimated Blood Loss (mL): less than 50     Complications: None    Specimens:   ID Type Source Tests Collected by Time Destination   A : right kidney stone Stone (Calculus) Tissue SURGICAL PATHOLOGY Alec Avila MD 4/1/2019 1441        Implants:  Implant Name Type Inv.  Item Serial No.  Lot No. LRB No. Used   STENT URET DBL PIGTL MULTI W/O [id number] Stent:Urological STENT URET DBL PIGTL MULTI W/O [id number]  [employer]: INTERVENTIONAL CARDIO [id number] Right 1         Drains: * No LDAs found *    Findings: Impacted distal left stone    Shravan Bailey MD  Date: 4/1/2019  Time: 2:55 PM

## 2019-04-01 NOTE — FLOWSHEET NOTE
04/01/19 0610   Vital Signs   Pulse 110   /74   BP Location Right upper arm   BP Upper/Lower Upper   Oxygen Therapy   SpO2 94 %   Pt resting in bed, awake/alert. No acute distress.  Aman Wasserman

## 2019-04-01 NOTE — ED NOTES
Report to Leticia Lou RN. Pt. Alert and oriented and VSS upon departure.       Adri Camarillo RN  04/01/19 8667

## 2019-04-01 NOTE — ED PROVIDER NOTES
[valproic acid]    Physical Exam                   ED Triage Vitals [03/31/19 2131]   BP Temp Temp Source Pulse Resp SpO2 Height Weight   103/88 99.8 °F (37.7 °C) Oral 128 18 98 % 5' 2\" (1.575 m) 150 lb (68 kg)      Oxygen Saturation Interpretation: Normal.    Constitutional:  Alert, development consistent with age. HEENT:  NC/NT. Airway patent. Neck:  Normal ROM. Supple. Respiratory:  Clear to auscultation and breath sounds equal.  CV:  tachycardic rate and rhythm  GI:  General Appearance: normal.       Bowel sounds: normal bowel sounds. Distension:  None. Tenderness: +right sided abdominal tenderness. +mcburney's sign. +obturator sign       Liver: non-tender. Spleen:  non-tender. Pulsatile Mass: absent. Hernia:  no inguinal or femoral hernias noted. Back: CVA Tenderness: No.  Integument:  Normal turgor. Warm, dry, without visible rash, unless noted elsewhere. Lymphatics: No lymphangitis or adenopathy noted. Neurological:  Oriented. Motor functions intact.     Lab / Imaging Results   (All laboratory and radiology results have been personally reviewed by myself)  Labs:  Results for orders placed or performed during the hospital encounter of 03/31/19   Urine, reflex to culture   Result Value Ref Range    Color, UA Yellow Straw/Yellow    Clarity, UA Clear Clear    Glucose, Ur Negative Negative mg/dL    Bilirubin Urine Negative Negative    Ketones, Urine Negative Negative mg/dL    Specific Gravity, UA 1.020 1.005 - 1.030    Blood, Urine MODERATE (A) Negative    pH, UA 6.5 5.0 - 8.0    Protein,  (A) Negative mg/dL    Urobilinogen, Urine 0.2 <2.0 E.U./dL    Nitrite, Urine Negative Negative    Leukocyte Esterase, Urine MODERATE (A) Negative    Microscopic Examination YES     Urine Reflex to Culture Yes     Urine Type Not Specified    CBC auto differential   Result Value Ref Range    WBC 19.6 (H) 4.0 - 11.0 K/uL    RBC 4.61 4.00 - 5.20 M/uL Hemoglobin 14.3 12.0 - 16.0 g/dL    Hematocrit 41.9 36.0 - 48.0 %    MCV 90.8 80.0 - 100.0 fL    MCH 30.9 26.0 - 34.0 pg    MCHC 34.1 31.0 - 36.0 g/dL    RDW 14.1 12.4 - 15.4 %    Platelets 509 153 - 087 K/uL    MPV 7.9 5.0 - 10.5 fL    SLIDE REVIEW see below     Neutrophils % 77.0 %    Lymphocytes % 2.0 %    Monocytes % 3.0 %    Eosinophils % 0.0 %    Basophils % 0.0 %    Neutrophils # 18.6 (H) 1.7 - 7.7 K/uL    Lymphocytes # 0.4 (L) 1.0 - 5.1 K/uL    Monocytes # 0.6 0.0 - 1.3 K/uL    Eosinophils # 0.0 0.0 - 0.6 K/uL    Basophils # 0.0 0.0 - 0.2 K/uL    Bands Relative 11 (H) 0 - 7 %    Metamyelocytes Relative 6 (A) %    Myelocytes Relative 1 (A) %    Anisocytosis Occasional (A)     Poikilocytes Occasional (A)    Comprehensive metabolic panel   Result Value Ref Range    Sodium 135 (L) 136 - 145 mmol/L    Potassium 3.7 3.5 - 5.1 mmol/L    Chloride 97 (L) 99 - 110 mmol/L    CO2 21 21 - 32 mmol/L    Anion Gap 17 (H) 3 - 16    Glucose 124 (H) 70 - 99 mg/dL    BUN 17 7 - 20 mg/dL    CREATININE 1.4 (H) 0.6 - 1.1 mg/dL    GFR Non- 40 (A) >60    GFR  48 (A) >60    Calcium 8.7 8.3 - 10.6 mg/dL    Total Protein 6.7 6.4 - 8.2 g/dL    Alb 3.4 3.4 - 5.0 g/dL    Albumin/Globulin Ratio 1.0 (L) 1.1 - 2.2    Total Bilirubin 0.9 0.0 - 1.0 mg/dL    Alkaline Phosphatase 122 40 - 129 U/L    ALT 45 (H) 10 - 40 U/L    AST 86 (H) 15 - 37 U/L    Globulin 3.3 g/dL   Microscopic Urinalysis   Result Value Ref Range    WBC, UA >100 (A) 0 - 5 /HPF    RBC, UA see below 0 - 2 /HPF   Lactic Acid, Plasma   Result Value Ref Range    Lactic Acid 2.7 (H) 0.4 - 2.0 mmol/L       Imaging: All Radiology results interpreted by Radiologist unless otherwise noted. CT ABDOMEN PELVIS WO CONTRAST   Preliminary Result   A 5 mm calculus within the distal right ureter, with associated mild right   hydronephrosis, right nephromegaly, and right perinephric stranding.            ED Course / Medical Decision Making     Medications   0.9 % sodium chloride IV bolus 2,040 mL (has no administration in time range)   cefTRIAXone (ROCEPHIN) 1 g IVPB in 50 mL D5W minibag (has no administration in time range)   cefTRIAXone (ROCEPHIN) 1 g IVPB in 50 mL D5W minibag (has no administration in time range)   0.9 % sodium chloride bolus (0 mLs Intravenous Stopped 3/31/19 2257)   0.9 % sodium chloride bolus (1,000 mLs Intravenous New Bag 3/31/19 2240)   famotidine (PEPCID) injection 20 mg (20 mg Intravenous Given 3/31/19 2240)   ketorolac (TORADOL) injection 30 mg (30 mg Intravenous Given 3/31/19 2240)   ondansetron (ZOFRAN) injection 4 mg (4 mg Intravenous Given 3/31/19 2240)   cefTRIAXone (ROCEPHIN) 1 g IVPB in 50 mL D5W minibag (1 g Intravenous New Bag 3/31/19 2358)   HYDROmorphone (DILAUDID) injection 1 mg (1 mg Intravenous Given 4/1/19 0030)        Re-examination:  4/1/19       Time: 0020: pain is not under control, discussed admission and pt is agreeable. Consult(s):   IP CONSULT TO HOSPITALIST    Dr Anu Nails agrees to admit in stable condition. MDM:   Afebrile, stable, patient presents to the ED for evaluation. Seen in conjunction with attending ED provider who agrees with assessment and plan, Dr Michelle Cao.   Patients PO2 is 97% on room air they are not hypoxic, patient reports a history of back pain and history of kidney stones however this is worse in his been accompanied by nausea and vomiting. Patient is ordered IV fluids in addition to Toradol Pepcid and Zofran. This does not improve her symptoms on reevaluation. Physical examination patient is nontoxic, PE is unremarkable, with the exception of right sided abdominal tenderness. No rebound no guarding. Patient's labs returned revealing leukocytosis with a white blood cell count of 19.6 bandemia with bands of 11. Sodium is 135 hyponatremia Catherine of 97. Anion gap of 17. Glucose 124 creatinine of 1.4 GFR of 40. This is a change from patient's baseline.   Elevated ALT and AST of 45 and 86 urinalysis reveals leukocytes. CT findings revealed a 5 mm stone in her right distal ureter with hydronephrosis and nephromegaly and stranding. Due to patient having a stone with associated infection acute kidney injury and possibility of sepsis feel patient should be admitted. Lactic and cultures is added to patient's workup IV Rocephin is ordered. Reassess patient's pain and she is still very uncomfortable therefore Dilaudid is ordered. Consult to the hospitalist who agrees to admit the patient in stable condition. Counseling: The emergency provider has spoken with the patient and discussed todays results, in addition to providing specific details for the plan of care and counseling regarding the diagnosis and prognosis. Questions are answered at this time and they are agreeable with the plan. Assessment      1. Septicemia ([address]    2. Kidney stone    3. Right flank pain    4. Hydronephrosis, unspecified hydronephrosis type    5. CURT (acute kidney injury) ([address]      Plan   Admit to med/surg floor  Patient condition is stable    New Medications     New Prescriptions    No medications on file     Electronically signed by Saman Kilpatrick PA-C   DD: 4/1/19  **This report was transcribed using voice recognition software. Every effort was made to ensure accuracy; however, inadvertent computerized transcription errors may be present.   END OF ED PROVIDER NOTE        Saman Kilpatrick PA-C  04/01/19 0056       Ame Arauz PA-C  04/01/19 0561

## 2019-04-01 NOTE — FLOWSHEET NOTE
04/01/19 0314   Vital Signs   Temp 98.2 °F (36.8 °C)   Temp Source Oral   Pulse 92   Heart Rate Source Monitor   Resp 18   BP 89/67   BP Location Right upper arm   BP Upper/Lower Upper   Patient Position Semi fowlers   Level of Consciousness 0   MEWS Score 2   Oxygen Therapy   SpO2 95 %   O2 Device None (Room air)   Pt denies dizziness, very weak on her feet. Perfectserve sent to  regarding hypotension.  [facility]

## 2019-04-01 NOTE — FLOWSHEET NOTE
04/01/19 0651   Vital Signs   Pulse 100   /75   BP Location Right lower arm   Pain Assessment   Pain Level 6   Dilaudid 0.125 mg IV given to pt for c/o abd pain.  Zenon Salines

## 2019-04-01 NOTE — ANESTHESIA POSTPROCEDURE EVALUATION
Department of Anesthesiology  Postprocedure Note    Patient: Ron Macdonald  MRN: [id number]  YOB: 1965  Date of evaluation: 4/1/2019  Time:  5:54 PM     Procedure Summary     Date:  04/01/19 Room / Location:  [address] [place] [address] [facility]    Anesthesia Start:  3778 Anesthesia Stop:  1526    Procedure:  CYSTOSCOPY, RIGHT URETEROSCOPY, HOLMIUM LASER LITHOTRIPSY, RIGHT URETERAL STENT PLACEMENT (Right Bladder) Diagnosis:  (DISTAL RIGHT STONE)    Surgeon:  Rhonda Martin MD Responsible Provider:  August Obrien MD    Anesthesia Type:  general ASA Status:  2 - Emergent          Anesthesia Type: general    Mallorie Phase I:      Mallorie Phase II:      Last vitals: Reviewed and per EMR flowsheets.        Anesthesia Post Evaluation    Patient location during evaluation: PACU  Patient participation: complete - patient participated  Level of consciousness: awake and alert  Airway patency: patent  Nausea & Vomiting: no nausea and no vomiting  Complications: no  Cardiovascular status: blood pressure returned to baseline  Respiratory status: acceptable  Hydration status: euvolemic

## 2019-04-01 NOTE — PROGRESS NOTES
Brief note     pt seen by david , Dr. Rosendo Alcocer early this AM.   please see his H and P .     pt seen    admitted with  Obstructing R ureteral stone . seen by urology.    R flank tender     plan cysto with possible R ureteral stent today

## 2019-04-01 NOTE — PROGRESS NOTES
Report given to [facility] RN. Possible OR today at [phone], made Tracee RN aware.  Bj Harris

## 2019-04-01 NOTE — FLOWSHEET NOTE
04/01/19 0517   Vital Signs   BP 98/70   BP Location Right upper arm   BP Upper/Lower Upper   1 liter NS bolus finished. Will recheck vitals in 1 hour.  Stephanie Muñiz

## 2019-04-01 NOTE — ED PROVIDER NOTES
I personally interviewed and examined this patient, discussed the findings, diagnostic studies, interventions and treatment plan with JESUS. My exam on the patient concurs with the JESUS exam. I reviewed the clinical notes and test results. I personally supervised all pertinent procedures performed on the patient by the JESUS throughout the course and was available to manage complications as they arose, if applicable. I agree with the assessment, management and disposition as presented by the JESUS with exceptions/corrections as documented. For further details of this emergency department encounter, please see the JESUS's documentation.       ED Triage Vitals [03/31/19 2131]   BP Temp Temp Source Pulse Resp SpO2 Height Weight   103/88 99.8 °F (37.7 °C) Oral 128 18 98 % 5' 2\" (1.575 m) 150 lb (68 kg)        Results for orders placed or performed during the hospital encounter of 03/31/19   Urine, reflex to culture   Result Value Ref Range    Color, UA Yellow Straw/Yellow    Clarity, UA Clear Clear    Glucose, Ur Negative Negative mg/dL    Bilirubin Urine Negative Negative    Ketones, Urine Negative Negative mg/dL    Specific Gravity, UA 1.020 1.005 - 1.030    Blood, Urine MODERATE (A) Negative    pH, UA 6.5 5.0 - 8.0    Protein,  (A) Negative mg/dL    Urobilinogen, Urine 0.2 <2.0 E.U./dL    Nitrite, Urine Negative Negative    Leukocyte Esterase, Urine MODERATE (A) Negative    Microscopic Examination YES     Urine Reflex to Culture Yes     Urine Type Not Specified    CBC auto differential   Result Value Ref Range    WBC 19.6 (H) 4.0 - 11.0 K/uL    RBC 4.61 4.00 - 5.20 M/uL    Hemoglobin 14.3 12.0 - 16.0 g/dL    Hematocrit 41.9 36.0 - 48.0 %    MCV 90.8 80.0 - 100.0 fL    MCH 30.9 26.0 - 34.0 pg    MCHC 34.1 31.0 - 36.0 g/dL    RDW 14.1 12.4 - 15.4 %    Platelets 190 288 - 360 K/uL    MPV 7.9 5.0 - 10.5 fL    SLIDE REVIEW see below     Neutrophils % 77.0 %    Lymphocytes % 2.0 %    Monocytes % 3.0 %    Eosinophils % 0.0 %    Basophils % 0.0 %    Neutrophils # 18.6 (H) 1.7 - 7.7 K/uL    Lymphocytes # 0.4 (L) 1.0 - 5.1 K/uL    Monocytes # 0.6 0.0 - 1.3 K/uL    Eosinophils # 0.0 0.0 - 0.6 K/uL    Basophils # 0.0 0.0 - 0.2 K/uL    Bands Relative 11 (H) 0 - 7 %    Metamyelocytes Relative 6 (A) %    Myelocytes Relative 1 (A) %    Anisocytosis Occasional (A)     Poikilocytes Occasional (A)    Comprehensive metabolic panel   Result Value Ref Range    Sodium 135 (L) 136 - 145 mmol/L    Potassium 3.7 3.5 - 5.1 mmol/L    Chloride 97 (L) 99 - 110 mmol/L    CO2 21 21 - 32 mmol/L    Anion Gap 17 (H) 3 - 16    Glucose 124 (H) 70 - 99 mg/dL    BUN 17 7 - 20 mg/dL    CREATININE 1.4 (H) 0.6 - 1.1 mg/dL    GFR Non- 40 (A) >60    GFR  48 (A) >60    Calcium 8.7 8.3 - 10.6 mg/dL    Total Protein 6.7 6.4 - 8.2 g/dL    Alb 3.4 3.4 - 5.0 g/dL    Albumin/Globulin Ratio 1.0 (L) 1.1 - 2.2    Total Bilirubin 0.9 0.0 - 1.0 mg/dL    Alkaline Phosphatase 122 40 - 129 U/L    ALT 45 (H) 10 - 40 U/L    AST 86 (H) 15 - 37 U/L    Globulin 3.3 g/dL   Microscopic Urinalysis   Result Value Ref Range    WBC, UA >100 (A) 0 - 5 /HPF    RBC, UA see below 0 - 2 /HPF       CT ABDOMEN PELVIS WO CONTRAST   Preliminary Result   A 5 mm calculus within the distal right ureter, with associated mild right   hydronephrosis, right nephromegaly, and right perinephric stranding. CLINICAL IMPRESSION  1. Septicemia ([address]    2. Kidney stone    3. Right flank pain    4. Hydronephrosis, unspecified hydronephrosis type        Blood pressure 103/88, pulse 107, temperature 99.8 °F (37.7 °C), temperature source Oral, resp. rate 16, height 5' 2\" (1.575 m), weight 150 lb (68 kg), last menstrual period 01/01/2007, SpO2 98 %. [address] was admitted in stable condition.      This chart was generated in part by using Dragon Dictation system and may contain errors related to that system including errors in grammar, punctuation, and spelling, as well as words and phrases that may be inappropriate. If there are any questions or concerns please feel free to contact the dictating provider for clarification.      Edita Jimenez DO  ATTENDING, EMERGENCY MEDICINE        Low Cox DO  04/01/19 0006

## 2019-04-01 NOTE — ANESTHESIA PRE PROCEDURE
Department of Anesthesiology  Preprocedure Note       Name:  Ron Macdonald   Age:  48 y.o.  :  1965                                          MRN:  [id number]         Date:  2019      Surgeon: Agustín Martinez):  Rhonda Martin MD    Procedure: CYSTOSCOPY RIGHT URETEROSCOPY, POSSIBLE HOLMIUM LASER LITHOTRIPSY, POSSIBLE STENT PLACEMENT (Right Bladder)    Medications prior to admission:   Prior to Admission medications    Medication Sig Start Date End Date Taking?  Authorizing Provider   QUEtiapine (SEROQUEL) 50 MG tablet Take 200 mg by mouth nightly Takes 1/2 tab twice daily and then takes 300 mg at night    Yes Historical Provider, MD   levothyroxine (SYNTHROID) 25 MCG tablet Take 1 tablet by mouth daily 3/20/19  Yes GERARD Miguel CNP   mirtazapine (REMERON) 7.5 MG tablet TAKE 1 TAB BY MOUTH NIGHTLY FOR 30 DAYS. 3/1/19  Yes Historical Provider, MD   lisinopril (PRINIVIL;ZESTRIL) 10 MG tablet TAKE 1 TABLET BY MOUTH EVERY DAY 18  Yes GERARD Miguel CNP   venlafaxine (EFFEXOR) 75 MG tablet Take 75 mg by mouth 2 times daily   Yes Historical Provider, MD   QUEtiapine (SEROQUEL) 300 MG tablet Take 100 mg by mouth daily    Yes Historical Provider, MD       Current medications:    Current Facility-Administered Medications   Medication Dose Route Frequency Provider Last Rate Last Dose    cefTRIAXone (ROCEPHIN) 1 g IVPB in 50 mL D5W minibag  1 g Intravenous Q12H Koki Hayes MD        levothyroxine (SYNTHROID) tablet 25 mcg  25 mcg Oral Daily Koki Hayes MD   25 mcg at 19 0911    sodium chloride flush 0.9 % injection 10 mL  10 mL Intravenous 2 times per day Koki Hayes MD        sodium chloride flush 0.9 % injection 10 mL  10 mL Intravenous PRN Koki Hayes MD        enoxaparin (LOVENOX) injection 40 mg  40 mg Subcutaneous Daily Koki Hayes MD        0.9 % sodium chloride infusion   Intravenous Continuous Koki Hayes  mL/hr at BP Readings from Last 3 Encounters:   04/01/19 128/84   03/28/19 111/76   03/19/19 112/78       NPO Status: Time of last liquid consumption: 0000                        Time of last solid consumption: 0000                        Date of last liquid consumption: 04/01/19                        Date of last solid food consumption: 04/01/19    BMI:   Wt Readings from Last 3 Encounters:   04/01/19 155 lb 4.8 oz (70.4 kg)   03/28/19 150 lb 12.8 oz (68.4 kg)   03/19/19 149 lb (67.6 kg)     Body mass index is 28.4 kg/m². CBC:   Lab Results   Component Value Date    WBC 19.6 03/31/2019    RBC 4.61 03/31/2019    HGB 14.3 03/31/2019    HCT 41.9 03/31/2019    MCV 90.8 03/31/2019    RDW 14.1 03/31/2019     03/31/2019       CMP:   Lab Results   Component Value Date     04/01/2019    K 4.1 04/01/2019     04/01/2019    CO2 19 04/01/2019    BUN 19 04/01/2019    CREATININE 1.2 04/01/2019    GFRAA 57 04/01/2019    AGRATIO 0.9 04/01/2019    LABGLOM 47 04/01/2019    GLUCOSE 115 04/01/2019    PROT 5.4 04/01/2019    CALCIUM 6.9 04/01/2019    BILITOT 0.4 04/01/2019    ALKPHOS 102 04/01/2019    AST 72 04/01/2019    ALT 39 04/01/2019       POC Tests: No results for input(s): POCGLU, POCNA, POCK, POCCL, POCBUN, POCHEMO, POCHCT in the last 72 hours.     Coags:   Lab Results   Component Value Date    PROTIME 18.8 04/01/2019    INR 1.65 04/01/2019       HCG (If Applicable): No results found for: PREGTESTUR, PREGSERUM, HCG, HCGQUANT     ABGs: No results found for: PHART, PO2ART, FRQ4XCI, QTC9ADG, BEART, D9RCKXSX     Type & Screen (If Applicable):  No results found for: LABABO, LABRH    Anesthesia Evaluation   no history of anesthetic complications:   Airway: Mallampati: II  TM distance: >3 FB   Neck ROM: full  Mouth opening: > = 3 FB Dental: normal exam         Pulmonary:   (+) current smoker                           Cardiovascular:    (+) hypertension:,          Beta Blocker: Not on Beta Blocker         Neuro/Psych:   (+) neuromuscular disease:, psychiatric history:depression/anxiety              ROS comment: PTSD GI/Hepatic/Renal:   (+) renal disease: kidney stones,          ROS comment: Severe pain. Endo/Other:    (+) hypothyroidism::., .                 Abdominal:           Vascular:                                     Pre-Operative Diagnosis: Hydronephrosis with urinary obstruction due to ureteral calculus [N13.2]    48 y.o.   BMI:  Body mass index is 28.4 kg/m². Vitals:    04/01/19 0610 04/01/19 0651 04/01/19 0909 04/01/19 1018   BP: 101/74 106/75 101/73 128/84   Pulse: 110 100 106 113   Resp:   16 18   Temp:   98.8 °F (37.1 °C)    TempSrc:   Oral    SpO2: 94%  93% 94%   Weight:       Height:           Allergies   Allergen Reactions    Depakote [Valproic Acid] Rash       Social History     Tobacco Use    Smoking status: Current Every Day Smoker     Packs/day: 0.25     Years: 4.00     Pack years: 1.00     Types: Cigarettes    Smokeless tobacco: Never Used   Substance Use Topics    Alcohol use: Yes     Comment: occasional       LABS:    CBC  Lab Results   Component Value Date/Time    WBC 19.6 (H) 03/31/2019 09:55 PM    HGB 14.3 03/31/2019 09:55 PM    HCT 41.9 03/31/2019 09:55 PM     03/31/2019 09:55 PM     RENAL  Lab Results   Component Value Date/Time     04/01/2019 05:55 AM    K 4.1 04/01/2019 05:55 AM     04/01/2019 05:55 AM    CO2 19 (L) 04/01/2019 05:55 AM    BUN 19 04/01/2019 05:55 AM    CREATININE 1.2 (H) 04/01/2019 05:55 AM    GLUCOSE 115 (H) 04/01/2019 05:55 AM     COAGS  Lab Results   Component Value Date/Time    PROTIME 18.8 (H) 04/01/2019 05:55 AM    INR 1.65 (H) 04/01/2019 05:55 AM        Anesthesia Plan      general     ASA 2 - emergent     (Pt agrees to risks, benefits and alternatives of GETA, RSI. Questions answered. Willing to proceed with plan.)  Induction: intravenous and rapid sequence.       Anesthetic plan and risks discussed with patient.                       Terrance Hillman MD   4/1/2019

## 2019-04-02 VITALS
OXYGEN SATURATION: 91 % | TEMPERATURE: 97.7 F | DIASTOLIC BLOOD PRESSURE: 104 MMHG | HEART RATE: 78 BPM | SYSTOLIC BLOOD PRESSURE: 160 MMHG | RESPIRATION RATE: 16 BRPM | BODY MASS INDEX: 29.37 KG/M2 | HEIGHT: 62 IN | WEIGHT: 159.6 LBS

## 2019-04-02 PROBLEM — N13.2 HYDRONEPHROSIS WITH URINARY OBSTRUCTION DUE TO URETERAL CALCULUS: Status: RESOLVED | Noted: 2019-04-01 | Resolved: 2019-04-02

## 2019-04-02 LAB
ORGANISM: ABNORMAL
URINE CULTURE, ROUTINE: ABNORMAL
URINE CULTURE, ROUTINE: ABNORMAL

## 2019-04-02 PROCEDURE — 2580000003 HC RX 258: Performed by: UROLOGY

## 2019-04-02 PROCEDURE — 6370000000 HC RX 637 (ALT 250 FOR IP): Performed by: INTERNAL MEDICINE

## 2019-04-02 PROCEDURE — 99238 HOSP IP/OBS DSCHRG MGMT 30/<: CPT | Performed by: INTERNAL MEDICINE

## 2019-04-02 PROCEDURE — 6360000002 HC RX W HCPCS: Performed by: UROLOGY

## 2019-04-02 PROCEDURE — 6370000000 HC RX 637 (ALT 250 FOR IP): Performed by: UROLOGY

## 2019-04-02 RX ORDER — CIPROFLOXACIN 250 MG/1
250 TABLET, FILM COATED ORAL 2 TIMES DAILY
Qty: 14 TABLET | Refills: 0 | Status: SHIPPED | OUTPATIENT
Start: 2019-04-02 | End: 2019-04-09

## 2019-04-02 RX ORDER — OXYCODONE HYDROCHLORIDE AND ACETAMINOPHEN 5; 325 MG/1; MG/1
1 TABLET ORAL EVERY 6 HOURS PRN
Qty: 20 TABLET | Refills: 0 | Status: SHIPPED | OUTPATIENT
Start: 2019-04-02 | End: 2019-04-07

## 2019-04-02 RX ADMIN — HYDROMORPHONE HYDROCHLORIDE 0.25 MG: 2 INJECTION, SOLUTION INTRAMUSCULAR; INTRAVENOUS; SUBCUTANEOUS at 04:43

## 2019-04-02 RX ADMIN — LEVOTHYROXINE SODIUM 25 MCG: 25 TABLET ORAL at 08:45

## 2019-04-02 RX ADMIN — CEFTRIAXONE SODIUM 1 G: 1 INJECTION, POWDER, FOR SOLUTION INTRAMUSCULAR; INTRAVENOUS at 04:31

## 2019-04-02 RX ADMIN — SODIUM CHLORIDE: 9 INJECTION, SOLUTION INTRAVENOUS at 07:20

## 2019-04-02 RX ADMIN — QUETIAPINE FUMARATE 100 MG: 100 TABLET ORAL at 08:45

## 2019-04-02 RX ADMIN — HYDROMORPHONE HYDROCHLORIDE 0.25 MG: 2 INJECTION, SOLUTION INTRAMUSCULAR; INTRAVENOUS; SUBCUTANEOUS at 09:28

## 2019-04-02 RX ADMIN — ENOXAPARIN SODIUM 40 MG: 100 INJECTION SUBCUTANEOUS at 08:45

## 2019-04-02 ASSESSMENT — PAIN DESCRIPTION - PROGRESSION
CLINICAL_PROGRESSION: GRADUALLY WORSENING
CLINICAL_PROGRESSION: GRADUALLY IMPROVING
CLINICAL_PROGRESSION: GRADUALLY WORSENING

## 2019-04-02 ASSESSMENT — PAIN DESCRIPTION - ORIENTATION
ORIENTATION: RIGHT

## 2019-04-02 ASSESSMENT — PAIN DESCRIPTION - DESCRIPTORS
DESCRIPTORS: CRAMPING
DESCRIPTORS: CRAMPING
DESCRIPTORS: CRAMPING;PRESSURE

## 2019-04-02 ASSESSMENT — PAIN DESCRIPTION - LOCATION
LOCATION: ABDOMEN
LOCATION: BACK;ABDOMEN
LOCATION: BACK;ABDOMEN

## 2019-04-02 ASSESSMENT — PAIN DESCRIPTION - PAIN TYPE
TYPE: ACUTE PAIN

## 2019-04-02 ASSESSMENT — PAIN DESCRIPTION - ONSET
ONSET: ON-GOING
ONSET: ON-GOING

## 2019-04-02 ASSESSMENT — PAIN SCALES - GENERAL
PAINLEVEL_OUTOF10: 5
PAINLEVEL_OUTOF10: 4
PAINLEVEL_OUTOF10: 4
PAINLEVEL_OUTOF10: 3

## 2019-04-02 ASSESSMENT — PAIN DESCRIPTION - FREQUENCY
FREQUENCY: INTERMITTENT
FREQUENCY: INTERMITTENT

## 2019-04-02 ASSESSMENT — PAIN - FUNCTIONAL ASSESSMENT: PAIN_FUNCTIONAL_ASSESSMENT: ACTIVITIES ARE NOT PREVENTED

## 2019-04-02 NOTE — PLAN OF CARE
Problem: Infection:  Goal: Will remain free from infection  Description  Will remain free from infection  Outcome: Ongoing     Problem: Safety:  Goal: Free from accidental physical injury  Description  Free from accidental physical injury  Outcome: Ongoing     Problem: Daily Care:  Goal: Daily care needs are met  Description  Daily care needs are met  Outcome: Ongoing     Problem: Pain:  Description  Pain management should include both nonpharmacologic and pharmacologic interventions.   Goal: Patient's pain/discomfort is manageable  Description  Patient's pain/discomfort is manageable  4/2/2019 1021 by Nitesh Donaldson RN  Outcome: Ongoing  4/1/2019 2334 by Sussy Bergman RN  Outcome: Ongoing  4/1/2019 2331 by Sussy Bergman RN  Outcome: Ongoing  Goal: Pain level will decrease  Description  Pain level will decrease  4/2/2019 1021 by Nitesh Donaldson RN  Outcome: Ongoing  4/1/2019 2334 by Sussy Bergman RN  Outcome: Ongoing  4/1/2019 2331 by Sussy Bergman RN  Outcome: Ongoing  Goal: Control of acute pain  Description  Control of acute pain  Outcome: Ongoing  Goal: Control of chronic pain  Description  Control of chronic pain  Outcome: Ongoing     Problem: Skin Integrity:  Goal: Skin integrity will stabilize  Description  Skin integrity will stabilize  Outcome: Ongoing     Problem: Discharge Planning:  Goal: Patients continuum of care needs are met  Description  Patients continuum of care needs are met  Outcome: Ongoing     Problem: Falls - Risk of:  Goal: Will remain free from falls  Description  Will remain free from falls  4/2/2019 1021 by Nitesh Donaldson RN  Outcome: Ongoing  4/1/2019 2334 by Sussy Bergman RN  Outcome: Ongoing  4/1/2019 2331 by Sussy Bergman RN  Outcome: Ongoing  Goal: Absence of physical injury  Description  Absence of physical injury  4/2/2019 1021 by Nitesh Donaldson RN  Outcome: Ongoing  4/1/2019 2334 by Sussy Bergman RN  Outcome: Ongoing  4/1/2019 2331 by Sussy Bergman RN  Outcome: Ongoing

## 2019-04-02 NOTE — PROGRESS NOTES
Patient is A/O x4. Assessment completed. Medications administered per MAR. Patient complains of 5/10 flank and RLQ pain consistent with admission dx. Administered 0.25mg Dilaudid. Pain reduced to 4/10 and tolerable per patient upon reassessment. Upon reassessment patient complains of nausea. Emesis x1. Zofran order requested, received, and administered. Patient resting comfortably. Will continue to monitor and assess.

## 2019-04-02 NOTE — PROGRESS NOTES
Prescriptions and discharge instructions given, pt verbalized understanding, denies questions/ needs at this time. Pt waiting on family arrival for discharge home. Leela Hdez RN

## 2019-04-02 NOTE — DISCHARGE SUMMARY
Name:  Rich Caruso  Room:  7805/0139-92  MRN:    [id number]    Discharge Summary      This discharge summary is in conjunction with a complete physical exam done on the day of discharge. Discharging Physician: Dr. Leopold Shivers: 3/31/2019  Discharge:   4/2/2019    HPI taken from admission H&P:    The patient is a 48 y.o. female with PMH below, presents with R flank and RLQ abd pain, N/V. She reports that this pain started 2 days ago, waxing and waning, severe, constant tonight so summoned EMS. She appeared to be in extreme pain at arrival in the ED. This has improved somewhat with pain Rx at this time. She reports associated N/V. She is a little drowsy after getting IV dilaudid. She reports that she has a hx of kidney stones but this was different from previous experiences with kidney stones. When asked how it was different she says it was much more painful. She has a hx of back surgery in the past.  She appears more comfortable at this time. Diagnoses this Admission and Hospital Course   Severe sepsis (HR, WBC, lactate 2.7)  - likely related to #3  - IV ceftriaxone 2g and then 1g q12 h  - Urine and Blood Cx  - sepsis rersolved Now       Obstructing R distal ureteral stone  - 5mm R distal ureter  -  Sx control   - urology consult--stent placed with lithotripsy      Acute pyelonephritis and/or infected stone   - ABX as above-->D/C on Cipro and pain control     CURT  - Cr 1.4, limited previous values, baseline appears to be <1.1.   - GFR previously > 60, now 43.    - IVF. Elevated LFT  - mild - see above. - IVF     PSY  - continue home medications       Tobacco Abuse  - counseled cessation, 14 mg nicotine patch.       Procedures (Please Review Full Report for Details)  Cystoscopy, R ureteroscopy, laser lithotripsy, R ureteral stent placement     Consults    Urology      Physical Exam at Discharge:    BP (!) 160/104   Pulse 78   Temp 97.7 °F (36.5 °C)   Resp 16   Ht 5' 2\" (1.575 m)   Wt 159 lb 9.6 oz (72.4 kg)   LMP 01/01/2007 (Approximate)   SpO2 91%   BMI 29.19 kg/m²     GEN:        A&Ox3, appears comfortable. NAD  HEENT:   NC/AT,EOMI, MMM, no erythema/exudates or visible masses. CVS:        Normal S1,S2. RRR. Without M/G/R.   LUNG:     CTA-B. no wheezes, rales or rhonchi  ABD:        Soft, ND, NT, BS+ x4. Without G/R.  EXT:        2+ pulses, no c/c/e. Brisk cap refill. PSY:        Thought process intact, affect appropriate. YING:        CN III-XII intact, moves all 4 spontaneously, sensory grossly intact. SKIN:       No rash or lesions on visible skin. CBC:   Recent Labs     03/31/19 2155   WBC 19.6*   HGB 14.3   HCT 41.9   MCV 90.8        BMP:   Recent Labs     03/31/19 2155 04/01/19  0555   * 138   K 3.7 4.1   CL 97* 106   CO2 21 19*   BUN 17 19   CREATININE 1.4* 1.2*     LIVER PROFILE:   Recent Labs     03/31/19 2155 04/01/19  0555   AST 86* 72*   ALT 45* 39   BILITOT 0.9 0.4   ALKPHOS 122 102     PT/INR:   Recent Labs     04/01/19  0555   PROTIME 18.8*   INR 1.65*     APTT: No results for input(s): APTT in the last 72 hours. UA:  Recent Labs     03/31/19 2146   COLORU Yellow   PHUR 6.5   WBCUA >100*   RBCUA see below   CLARITYU Clear   SPECGRAV 1.020   LEUKOCYTESUR MODERATE*   UROBILINOGEN 0.2   BILIRUBINUR Negative   BLOODU MODERATE*   GLUCOSEU Negative     CULTURES  Blood Cx: NGTD   Urine Cx: Gram neg Jalil     RADIOLOGY  FL RETROGRADE PYELOGRAM RIGHT   Final Result   Intraprocedural fluoroscopic spot images as above. See separate procedure   report for more information. CT ABDOMEN PELVIS WO CONTRAST   Final Result   5 mm calculus within the distal right ureter, with associated mild right   hydronephrosis, right nephromegaly, and right perinephric stranding.              Discharge Medications     Medication List      START taking these medications    ciprofloxacin 250 MG tablet  Commonly known as:  CIPRO  Take 1 tablet by mouth 2 times daily for 7 days  Notes to patient:  Ciprofloxacin (Cipro®)  Use: treat infections    Side effects: dizziness, nausea, diarrhea, or     headache. Finish all this medication     oxyCODONE-acetaminophen 5-325 MG per tablet  Commonly known as:  PERCOCET  Take 1 tablet by mouth every 6 hours as needed for Pain for up to 5 days. Intended supply: 3 days. Take lowest dose possible to manage pain  Notes to patient:  Oxycontin(Oxycodone)  Use:  pain    Side effects: sleepiness, constipation        CHANGE how you take these medications    * QUEtiapine 200 MG tablet  Commonly known as:  SEROQUEL  What changed:  Another medication with the same name was removed. Continue taking this medication, and follow the directions you see here. * QUEtiapine 100 MG tablet  Commonly known as:  SEROQUEL  What changed:  Another medication with the same name was removed. Continue taking this medication, and follow the directions you see here. * This list has 2 medication(s) that are the same as other medications prescribed for you. Read the directions carefully, and ask your doctor or other care provider to review them with you. CONTINUE taking these medications    levothyroxine 25 MCG tablet  Commonly known as:  SYNTHROID  Take 1 tablet by mouth daily     lisinopril 10 MG tablet  Commonly known as:  PRINIVIL;ZESTRIL  TAKE 1 TABLET BY MOUTH EVERY DAY     mirtazapine 7.5 MG tablet  Commonly known as:  REMERON     venlafaxine 75 MG tablet  Commonly known as:  EFFEXOR           Where to Get Your Medications      You can get these medications from any pharmacy    Bring a paper prescription for each of these medications  · ciprofloxacin 250 MG tablet  · oxyCODONE-acetaminophen 5-325 MG per tablet         Discharged in stable condition to home    Follow Up:   Follow up with PCP and urology in 1 week          Yesica Cerrato MD  4/2/19

## 2019-04-02 NOTE — PROGRESS NOTES
Bedside report given to Patrizia BOOKER  pt in stable condition no needs at this time.  Call light within reach

## 2019-04-03 ENCOUNTER — TELEPHONE (OUTPATIENT)
Dept: FAMILY MEDICINE CLINIC | Age: 54
End: 2019-04-03

## 2019-04-05 LAB
CALCULI COMPOSITION: NORMAL
MASS: 44 MG
STONE DESCRIPTION: NORMAL
STONE NUMBER: 3
STONE SIZE: NORMAL MM

## 2019-04-06 LAB
BLOOD CULTURE, ROUTINE: NORMAL
CULTURE, BLOOD 2: NORMAL

## 2019-04-06 NOTE — OP NOTE
to  place a 6 x 22 double-J stent. This was done without any difficulty  showing an adequate curl under fluoroscopy, using the cystoscope in the  patient's renal pelvis as well as her bladder. Overall, the patient  tolerated the procedure well. After draining her bladder, lidocaine  jelly was applied. She was taken back to the postop recovery room in  stable condition.         Adolfo Wu MD    D: 04/06/2019 8:15:56       T: 04/06/2019 11:25:05     [email or web address]  Job#: [id number]     Doc#: [id number]    CC:

## 2019-04-25 NOTE — TELEPHONE ENCOUNTER
Patient has been in the hospital with kidney issues. She is ready to schedule test. I will have Kamla call her today or tomorrow.

## 2019-04-25 NOTE — TELEPHONE ENCOUNTER
4-25-19  at [phone] informing to pt to call mhi and office to schedule stress echo per [facility].

## 2019-04-26 ENCOUNTER — ANESTHESIA EVENT (OUTPATIENT)
Dept: OPERATING ROOM | Age: 54
End: 2019-04-26
Payer: MEDICARE

## 2019-04-29 ENCOUNTER — HOSPITAL ENCOUNTER (OUTPATIENT)
Age: 54
Setting detail: OUTPATIENT SURGERY
Discharge: HOME | End: 2019-04-29
Attending: UROLOGY | Admitting: UROLOGY
Payer: MEDICARE

## 2019-04-29 ENCOUNTER — ANESTHESIA (OUTPATIENT)
Dept: OPERATING ROOM | Age: 54
End: 2019-04-29
Payer: MEDICARE

## 2019-04-29 VITALS
OXYGEN SATURATION: 96 % | DIASTOLIC BLOOD PRESSURE: 70 MMHG | TEMPERATURE: 97 F | HEIGHT: 62 IN | HEART RATE: 89 BPM | RESPIRATION RATE: 16 BRPM | SYSTOLIC BLOOD PRESSURE: 111 MMHG | WEIGHT: 150 LBS | BODY MASS INDEX: 27.6 KG/M2

## 2019-04-29 VITALS
SYSTOLIC BLOOD PRESSURE: 86 MMHG | RESPIRATION RATE: 11 BRPM | OXYGEN SATURATION: 100 % | DIASTOLIC BLOOD PRESSURE: 58 MMHG

## 2019-04-29 DIAGNOSIS — N20.0 KIDNEY STONE: Primary | ICD-10-CM

## 2019-04-29 PROCEDURE — 7100000011 HC PHASE II RECOVERY - ADDTL 15 MIN: Performed by: UROLOGY

## 2019-04-29 PROCEDURE — 6360000002 HC RX W HCPCS: Performed by: ANESTHESIOLOGY

## 2019-04-29 PROCEDURE — 2500000003 HC RX 250 WO HCPCS: Performed by: NURSE ANESTHETIST, CERTIFIED REGISTERED

## 2019-04-29 PROCEDURE — 3700000000 HC ANESTHESIA ATTENDED CARE: Performed by: UROLOGY

## 2019-04-29 PROCEDURE — 2580000003 HC RX 258: Performed by: ANESTHESIOLOGY

## 2019-04-29 PROCEDURE — 7100000010 HC PHASE II RECOVERY - FIRST 15 MIN: Performed by: UROLOGY

## 2019-04-29 PROCEDURE — 6370000000 HC RX 637 (ALT 250 FOR IP): Performed by: ANESTHESIOLOGY

## 2019-04-29 PROCEDURE — 6370000000 HC RX 637 (ALT 250 FOR IP): Performed by: UROLOGY

## 2019-04-29 PROCEDURE — 2580000003 HC RX 258: Performed by: UROLOGY

## 2019-04-29 PROCEDURE — 6360000002 HC RX W HCPCS: Performed by: UROLOGY

## 2019-04-29 PROCEDURE — 6360000002 HC RX W HCPCS: Performed by: NURSE ANESTHETIST, CERTIFIED REGISTERED

## 2019-04-29 PROCEDURE — 2500000003 HC RX 250 WO HCPCS: Performed by: ANESTHESIOLOGY

## 2019-04-29 PROCEDURE — 3600000004 HC SURGERY LEVEL 4 BASE: Performed by: UROLOGY

## 2019-04-29 PROCEDURE — 2709999900 HC NON-CHARGEABLE SUPPLY: Performed by: UROLOGY

## 2019-04-29 RX ORDER — LIDOCAINE HYDROCHLORIDE 20 MG/ML
INJECTION, SOLUTION INFILTRATION; PERINEURAL PRN
Status: DISCONTINUED | OUTPATIENT
Start: 2019-04-29 | End: 2019-04-29 | Stop reason: SDUPTHER

## 2019-04-29 RX ORDER — MEPERIDINE HYDROCHLORIDE 25 MG/ML
12.5 INJECTION INTRAMUSCULAR; INTRAVENOUS; SUBCUTANEOUS EVERY 5 MIN PRN
Status: DISCONTINUED | OUTPATIENT
Start: 2019-04-29 | End: 2019-04-29 | Stop reason: HOSPADM

## 2019-04-29 RX ORDER — LABETALOL HYDROCHLORIDE 5 MG/ML
5 INJECTION, SOLUTION INTRAVENOUS
Status: DISCONTINUED | OUTPATIENT
Start: 2019-04-29 | End: 2019-04-29 | Stop reason: HOSPADM

## 2019-04-29 RX ORDER — OXYCODONE HYDROCHLORIDE AND ACETAMINOPHEN 5; 325 MG/1; MG/1
0.5 TABLET ORAL EVERY 4 HOURS PRN
Qty: 10 TABLET | Refills: 0 | Status: SHIPPED | OUTPATIENT
Start: 2019-04-29 | End: 2019-05-04

## 2019-04-29 RX ORDER — HYDRALAZINE HYDROCHLORIDE 20 MG/ML
5 INJECTION INTRAMUSCULAR; INTRAVENOUS EVERY 30 MIN PRN
Status: DISCONTINUED | OUTPATIENT
Start: 2019-04-29 | End: 2019-04-29 | Stop reason: HOSPADM

## 2019-04-29 RX ORDER — APREPITANT 40 MG/1
40 CAPSULE ORAL ONCE
Status: COMPLETED | OUTPATIENT
Start: 2019-04-29 | End: 2019-04-29

## 2019-04-29 RX ORDER — SODIUM CHLORIDE 0.9 % (FLUSH) 0.9 %
10 SYRINGE (ML) INJECTION PRN
Status: DISCONTINUED | OUTPATIENT
Start: 2019-04-29 | End: 2019-04-29 | Stop reason: HOSPADM

## 2019-04-29 RX ORDER — SODIUM CHLORIDE 0.9 % (FLUSH) 0.9 %
10 SYRINGE (ML) INJECTION EVERY 12 HOURS SCHEDULED
Status: DISCONTINUED | OUTPATIENT
Start: 2019-04-29 | End: 2019-04-29 | Stop reason: HOSPADM

## 2019-04-29 RX ORDER — SULFAMETHOXAZOLE AND TRIMETHOPRIM 400; 80 MG/1; MG/1
1 TABLET ORAL 2 TIMES DAILY
Qty: 8 TABLET | Refills: 0 | Status: SHIPPED | OUTPATIENT
Start: 2019-04-29 | End: 2019-05-03

## 2019-04-29 RX ORDER — DIPHENHYDRAMINE HYDROCHLORIDE 50 MG/ML
6.25 INJECTION INTRAMUSCULAR; INTRAVENOUS
Status: DISCONTINUED | OUTPATIENT
Start: 2019-04-29 | End: 2019-04-29 | Stop reason: HOSPADM

## 2019-04-29 RX ORDER — HYDROMORPHONE HCL 110MG/55ML
0.5 PATIENT CONTROLLED ANALGESIA SYRINGE INTRAVENOUS EVERY 5 MIN PRN
Status: DISCONTINUED | OUTPATIENT
Start: 2019-04-29 | End: 2019-04-29 | Stop reason: HOSPADM

## 2019-04-29 RX ORDER — PROPOFOL 10 MG/ML
INJECTION, EMULSION INTRAVENOUS PRN
Status: DISCONTINUED | OUTPATIENT
Start: 2019-04-29 | End: 2019-04-29 | Stop reason: SDUPTHER

## 2019-04-29 RX ORDER — HYDROMORPHONE HCL 110MG/55ML
0.5 PATIENT CONTROLLED ANALGESIA SYRINGE INTRAVENOUS EVERY 10 MIN PRN
Status: DISCONTINUED | OUTPATIENT
Start: 2019-04-29 | End: 2019-04-29 | Stop reason: HOSPADM

## 2019-04-29 RX ORDER — LIDOCAINE HYDROCHLORIDE 10 MG/ML
1 INJECTION, SOLUTION EPIDURAL; INFILTRATION; INTRACAUDAL; PERINEURAL
Status: COMPLETED | OUTPATIENT
Start: 2019-04-29 | End: 2019-04-29

## 2019-04-29 RX ORDER — LIDOCAINE HYDROCHLORIDE 20 MG/ML
JELLY TOPICAL PRN
Status: DISCONTINUED | OUTPATIENT
Start: 2019-04-29 | End: 2019-04-29 | Stop reason: ALTCHOICE

## 2019-04-29 RX ORDER — SODIUM CHLORIDE, SODIUM LACTATE, POTASSIUM CHLORIDE, CALCIUM CHLORIDE 600; 310; 30; 20 MG/100ML; MG/100ML; MG/100ML; MG/100ML
INJECTION, SOLUTION INTRAVENOUS CONTINUOUS
Status: DISCONTINUED | OUTPATIENT
Start: 2019-04-29 | End: 2019-04-29 | Stop reason: HOSPADM

## 2019-04-29 RX ORDER — ONDANSETRON 2 MG/ML
4 INJECTION INTRAMUSCULAR; INTRAVENOUS EVERY 30 MIN PRN
Status: DISCONTINUED | OUTPATIENT
Start: 2019-04-29 | End: 2019-04-29 | Stop reason: HOSPADM

## 2019-04-29 RX ORDER — OXYCODONE HYDROCHLORIDE AND ACETAMINOPHEN 5; 325 MG/1; MG/1
1 TABLET ORAL PRN
Status: COMPLETED | OUTPATIENT
Start: 2019-04-29 | End: 2019-04-29

## 2019-04-29 RX ORDER — MAGNESIUM HYDROXIDE 1200 MG/15ML
LIQUID ORAL PRN
Status: DISCONTINUED | OUTPATIENT
Start: 2019-04-29 | End: 2019-04-29 | Stop reason: ALTCHOICE

## 2019-04-29 RX ORDER — OXYCODONE HYDROCHLORIDE AND ACETAMINOPHEN 5; 325 MG/1; MG/1
2 TABLET ORAL PRN
Status: COMPLETED | OUTPATIENT
Start: 2019-04-29 | End: 2019-04-29

## 2019-04-29 RX ORDER — VENLAFAXINE 100 MG/1
150 TABLET ORAL 2 TIMES DAILY
COMMUNITY

## 2019-04-29 RX ADMIN — OXYCODONE HYDROCHLORIDE AND ACETAMINOPHEN 2 TABLET: 5; 325 TABLET ORAL at 13:49

## 2019-04-29 RX ADMIN — LIDOCAINE HYDROCHLORIDE 60 MG: 20 INJECTION, SOLUTION INFILTRATION; PERINEURAL at 13:27

## 2019-04-29 RX ADMIN — Medication 2 G: at 13:21

## 2019-04-29 RX ADMIN — SODIUM CHLORIDE, POTASSIUM CHLORIDE, SODIUM LACTATE AND CALCIUM CHLORIDE: 600; 310; 30; 20 INJECTION, SOLUTION INTRAVENOUS at 13:24

## 2019-04-29 RX ADMIN — APREPITANT 40 MG: 40 CAPSULE ORAL at 12:49

## 2019-04-29 RX ADMIN — LIDOCAINE HYDROCHLORIDE 0.2 ML: 10 INJECTION, SOLUTION EPIDURAL; INFILTRATION; INTRACAUDAL; PERINEURAL at 12:56

## 2019-04-29 RX ADMIN — SODIUM CHLORIDE, POTASSIUM CHLORIDE, SODIUM LACTATE AND CALCIUM CHLORIDE: 600; 310; 30; 20 INJECTION, SOLUTION INTRAVENOUS at 12:56

## 2019-04-29 RX ADMIN — PROPOFOL 150 MG: 10 INJECTION, EMULSION INTRAVENOUS at 13:27

## 2019-04-29 ASSESSMENT — PULMONARY FUNCTION TESTS
PIF_VALUE: 0

## 2019-04-29 ASSESSMENT — PAIN DESCRIPTION - ORIENTATION: ORIENTATION: LOWER

## 2019-04-29 ASSESSMENT — PAIN SCALES - GENERAL
PAINLEVEL_OUTOF10: 7
PAINLEVEL_OUTOF10: 7

## 2019-04-29 ASSESSMENT — PAIN DESCRIPTION - LOCATION: LOCATION: ABDOMEN

## 2019-04-29 ASSESSMENT — PAIN - FUNCTIONAL ASSESSMENT: PAIN_FUNCTIONAL_ASSESSMENT: 0-10

## 2019-04-29 ASSESSMENT — LIFESTYLE VARIABLES: SMOKING_STATUS: 1

## 2019-04-29 ASSESSMENT — PAIN DESCRIPTION - PAIN TYPE: TYPE: SURGICAL PAIN

## 2019-04-29 NOTE — BRIEF OP NOTE
Brief Postoperative Note  ______________________________________________________________    Patient: Angela Bowman  YOB: 1965  MRN: [id number]  Date of Procedure: 4/29/2019    Pre-Op Diagnosis: CALCULUS OF URETER    Post-Op Diagnosis: Same       Procedure(s):  CYSTOSCOPY RIGHT STENT REMOVAL    Anesthesia: General    Surgeon(s):  Mohini Morris MD    Assistant:     Estimated Blood Loss (mL): less than 50     Complications: None    Specimens:   * No specimens in log *    Implants:  * No implants in log *      Drains: * No LDAs found *    Findings: Right stent    Jeronimo Palm MD  Date: 4/29/2019  Time: 2:06 PM

## 2019-04-29 NOTE — ANESTHESIA POSTPROCEDURE EVALUATION
Department of Anesthesiology  Postprocedure Note    Patient: Shila Craft  MRN: [id number]  YOB: 1965  Date of evaluation: 4/29/2019  Time:  3:37 PM     Procedure Summary     Date:  04/29/19 Room / Location:  [place] [address] / [facility]    Anesthesia Start:  4477 Anesthesia Stop:  9466    Procedure:  CYSTOSCOPY RIGHT STENT REMOVAL (N/A Bladder) Diagnosis:  (CALCULUS OF URETER)    Surgeon:  Lashanda Ye MD Responsible Provider:  Argentina Graham MD    Anesthesia Type:  general ASA Status:  2          Anesthesia Type: general    Mallorie Phase I: Mallorie Score: 10    Mallorie Phase II: Mallorie Score: 10    Last vitals: Reviewed and per EMR flowsheets.        Anesthesia Post Evaluation    Comments: Postoperative Anesthesia Note    Name:    Shila Craft  MRN:      [id number]    Patient Vitals in the past 12 hrs:  04/29/19 1410, BP:111/70, Pulse:89, Resp:16, SpO2:96 %  04/29/19 1337, BP:109/67, Temp:97 °F (36.1 °C), Temp src:Temporal, Pulse:91, Resp:14, SpO2:99 %  04/29/19 1238, BP:121/81, Temp:97.1 °F (36.2 °C), Temp src:Temporal, Pulse:82, Resp:16, SpO2:100 %     LABS:    CBC  Lab Results       Component                Value               Date/Time                  WBC                      19.6 (H)            03/31/2019 09:55 PM        HGB                      14.3                03/31/2019 09:55 PM        HCT                      41.9                03/31/2019 09:55 PM        PLT                      198                 03/31/2019 09:55 PM   RENAL  Lab Results       Component                Value               Date/Time                  NA                       138                 04/01/2019 05:55 AM        K                        4.1                 04/01/2019 05:55 AM        CL                       106                 04/01/2019 05:55 AM        CO2                      19 (L)              04/01/2019 05:55 AM        BUN                      19                  04/01/2019 05:55 AM CREATININE               1.2 (H)             04/01/2019 05:55 AM        GLUCOSE                  115 (H)             04/01/2019 05:55 AM   COAGS  Lab Results       Component                Value               Date/Time                  PROTIME                  18.8 (H)            04/01/2019 05:55 AM        INR                      1.65 (H)            04/01/2019 05:55 AM     Intake & Output: In: 500 (I.V.:500)  Out: -     Nausea & Vomiting:  No    Level of Consciousness:  Awake    Pain Assessment:  Adequate analgesia    Anesthesia Complications:  No apparent anesthetic complications    SUMMARY      Vital signs stable  OK to discharge from Stage I post anesthesia care.   Care transferred from Anesthesiology department on discharge from perioperative area

## 2019-04-29 NOTE — PROGRESS NOTES
Report from [facility] and CRNA. Pt arrived to postop from OR. See doc flow for vitals and assessment. Will monitor .

## 2019-04-29 NOTE — ANESTHESIA PRE PROCEDURE
Department of Anesthesiology  Preprocedure Note       Name:  Javy Camacho   Age:  48 y.o.  :  1965                                          MRN:  [id number]         Date:  2019      Surgeon: Yumi Padilla):  Krishna Hernandez MD    Procedure: CYSTOSCOPY RIGHT STENT REMOVAL (N/A Bladder)    Medications prior to admission:   Prior to Admission medications    Medication Sig Start Date End Date Taking?  Authorizing Provider   QUEtiapine (SEROQUEL) 200 MG tablet Take 200 mg by mouth nightly   Yes Historical Provider, MD   QUEtiapine (SEROQUEL) 100 MG tablet Take 50 mg by mouth 2 times daily    Yes Historical Provider, MD   levothyroxine (SYNTHROID) 25 MCG tablet Take 1 tablet by mouth daily 3/20/19  Yes GERARD Hansen CNP   lisinopril (PRINIVIL;ZESTRIL) 10 MG tablet TAKE 1 TABLET BY MOUTH EVERY DAY 18  Yes GERARD Hansen CNP       Current medications:    Current Facility-Administered Medications   Medication Dose Route Frequency Provider Last Rate Last Dose    ceFAZolin (ANCEF) 2 g in sterile water 20 mL IV syringe  2 g Intravenous Once Krishna Hernandez MD        lactated ringers infusion   Intravenous Continuous Brian Hernandez MD        sodium chloride flush 0.9 % injection 10 mL  10 mL Intravenous 2 times per day Brian Hernandez MD        sodium chloride flush 0.9 % injection 10 mL  10 mL Intravenous PRN Brian Hernandez MD        lidocaine PF 1 % injection 1 mL  1 mL Intradermal Once PRN Brian Hernandez MD        aprepitant (EMEND) capsule 40 mg  40 mg Oral Once Brian Hernandez MD        HYDROmorphone (DILAUDID) injection 0.5 mg  0.5 mg Intravenous Q10 Min PRN Danae Benitez MD        HYDROmorphone (DILAUDID) injection 0.5 mg  0.5 mg Intravenous Q5 Min PRN Danae Benitez MD        oxyCODONE-acetaminophen (PERCOCET) 5-325 MG per tablet 1 tablet  1 tablet Oral PRN Danae Benitez MD        Or    oxyCODONE-acetaminophen (PERCOCET) 6-246 MG per tablet 2 tablet  2 tablet Oral PRN Alvarez Lehman MD        diphenhydrAMINE (BENADRYL) injection 6.25 mg  6.25 mg Intravenous Once PRN Alvarez Lehman MD        [facility]dansetron [facility]) injection 4 mg  4 mg Intravenous Q30 Min PRN Alvarez Lehman MD        labetalol (NORMODYNE;TRANDATE) injection 5 mg  5 mg Intravenous Q15 Min PRN Alvarez Lehman MD        hydrALAZINE (APRESOLINE) injection 5 mg  5 mg Intravenous Q30 Min PRN Alvarez Lehman MD        meperidine (DEMEROL) injection 12.5 mg  12.5 mg Intravenous Q5 Min PRN Alvarez Lehman MD           Allergies:     Allergies   Allergen Reactions    Depakote [Valproic Acid] Rash       Problem List:    Patient Active Problem List   Diagnosis Code    PTSD (post-traumatic stress disorder) F43.10    Bipolar disease, chronic (HCC) F31.9    Moderate episode of recurrent major depressive disorder ([facility] [address] F33.1    Essential hypertension I10    Mixed hyperlipidemia E78.2    Precordial pain R07.2    Dizziness R42    Palpitations R00.2    Septicemia ([facility] [address] A41.9    Kidney stone N20.0    CURT (acute kidney injury) ([facility] [address] N17.9       Past Medical History:        Diagnosis Date    Hypothyroid     Kidney stone     1-2 years ago    Thyroid disease        Past Surgical History:        Procedure Laterality Date    BACK SURGERY  2014    CYSTOSCOPY INSERTION / REMOVAL STENT / STONE Right 4/1/2019    CYSTOSCOPY, RIGHT URETEROSCOPY, HOLMIUM LASER LITHOTRIPSY, RIGHT URETERAL STENT PLACEMENT performed by Juancarlos Gastelum MD at [facility]  2007       Social History:    Social History     Tobacco Use    Smoking status: Current Every Day Smoker     Packs/day: 0.25     Years: 4.00     Pack years: 1.00     Types: Cigarettes    Smokeless tobacco: Never Used   Substance Use Topics    Alcohol use: Yes     Comment: occasional                                Ready to quit: Not Answered  Counseling given: Not Answered      Vital Signs (Current):   Vitals:    04/26/19 0951   Weight: 150 lb (68 kg)   Height: 5' 2\" (1.575 m)                                              BP Readings from Last 3 Encounters:   04/02/19 (!) 160/104   04/01/19 92/68   03/28/19 111/76       NPO Status:                                                                                 BMI:   Wt Readings from Last 3 Encounters:   04/26/19 150 lb (68 kg)   04/02/19 159 lb 9.6 oz (72.4 kg)   03/28/19 150 lb 12.8 oz (68.4 kg)     Body mass index is 27.44 kg/m². CBC:   Lab Results   Component Value Date    WBC 19.6 03/31/2019    RBC 4.61 03/31/2019    HGB 14.3 03/31/2019    HCT 41.9 03/31/2019    MCV 90.8 03/31/2019    RDW 14.1 03/31/2019     03/31/2019       CMP:   Lab Results   Component Value Date     04/01/2019    K 4.1 04/01/2019     04/01/2019    CO2 19 04/01/2019    BUN 19 04/01/2019    CREATININE 1.2 04/01/2019    GFRAA 57 04/01/2019    AGRATIO 0.9 04/01/2019    LABGLOM 47 04/01/2019    GLUCOSE 115 04/01/2019    PROT 5.4 04/01/2019    CALCIUM 6.9 04/01/2019    BILITOT 0.4 04/01/2019    ALKPHOS 102 04/01/2019    AST 72 04/01/2019    ALT 39 04/01/2019       POC Tests: No results for input(s): POCGLU, POCNA, POCK, POCCL, POCBUN, POCHEMO, POCHCT in the last 72 hours.     Coags:   Lab Results   Component Value Date    PROTIME 18.8 04/01/2019    INR 1.65 04/01/2019       HCG (If Applicable): No results found for: PREGTESTUR, PREGSERUM, HCG, HCGQUANT     ABGs: No results found for: PHART, PO2ART, MJX8HNK, DRK0JJJ, BEART, X8LULCER     Type & Screen (If Applicable):  No results found for: LABABO, [address]    Anesthesia Evaluation  Patient summary reviewed and Nursing notes reviewed no history of anesthetic complications:   Airway: Mallampati: II     Neck ROM: full   Dental:          Pulmonary:   (+) current smoker                           Cardiovascular:    (+) hypertension:,                   Neuro/Psych:   (+) psychiatric history: GI/Hepatic/Renal:             Endo/Other:    (+) hypothyroidism::., .                 Abdominal:           Vascular:                                        Anesthesia Plan      general     ASA 2     (Medications & allergies reviewed  All available lab & EKG data reviewed)  Induction: intravenous. Anesthetic plan and risks discussed with patient. Plan discussed with CRNA.                   Franz Harada, MD   4/29/2019

## 2019-04-30 NOTE — OP NOTE
[address]                 [address] [address]                                OPERATIVE REPORT    PATIENT NAME: Jonathan Soriano                 :        1965  MED REC NO:   [id number]                          ROOM:  ACCOUNT NO:   [de-identified]                           ADMIT DATE: 2019  PROVIDER:     Jamey Holliday MD    DATE OF PROCEDURE:  2019    PREOPERATIVE DIAGNOSIS:  Right hydro, right ureteral stent. POSTOPERATIVE DIAGNOSIS:  Right hydro, right ureteral stent. OPERATION PERFORMED:  Cystoscopy with right ureteral stent removal.    PRIMARY SURGEON:  Jamey Holliday MD    ANESTHESIA:  General.    OPERATIVE FINDINGS:  Right ureteral stent. HISTORY:  This is a 82-year-old white female who had undergone a  difficult ureteroscopy, stone extraction and stent placement. She is  now presenting today to have the double J stent removed. Risks,  benefits, and expected outcomes of the procedure have been discussed. DETAILS OF THE PROCEDURE:  After obtaining informed consent, the patient  was taken to the operative suite. She was given a general anesthetic  and placed on the operative table in a modified dorsal lithotomy  position. Prepping and draping was done in a sterile fashion. Cystourethroscopy was then performed with both 30 and 70-degree lenses. Panendoscopy was done showing the indwelling ureteral stent, which was  subsequently grasped and removed. There was surrounding edema and  erythema around the ureteral orifice, but good efflux of urine  indicated. No further evidence for obstruction. The patient's bladder  was then drained and lidocaine jelly was applied. She was taken back to  the postop recovery room in stable condition.         Haroldine Najjar, MD    D: 2019 9:08:39       T: 2019 12:51:37     [email or web address]  Job#: [id number]     Doc#: [id number]    CC:

## 2019-07-03 DIAGNOSIS — I10 ESSENTIAL HYPERTENSION: ICD-10-CM

## 2019-07-03 RX ORDER — LISINOPRIL 10 MG/1
TABLET ORAL
Qty: 90 TABLET | Refills: 1 | Status: SHIPPED | OUTPATIENT
Start: 2019-07-03

## 2022-11-29 ENCOUNTER — HOSPITAL ENCOUNTER (OUTPATIENT)
Dept: MAMMOGRAPHY | Age: 57
Discharge: HOME | End: 2022-11-29
Payer: MEDICARE

## 2022-11-29 DIAGNOSIS — Z12.39 SCREENING BREAST EXAMINATION: ICD-10-CM

## 2022-11-29 PROCEDURE — 77067 SCR MAMMO BI INCL CAD: CPT

## (undated) DEVICE — BOWL MED L 32OZ PLAS W/ MOLD GRAD EZ OPN PEEL PCH

## (undated) DEVICE — Z CONVERTED USE 2271043 CONTAINER SPEC COLL 4OZ SCR ON LID PEEL PCH

## (undated) DEVICE — SYRINGE MED 10ML LUERLOCK TIP W/O SFTY DISP

## (undated) DEVICE — Z DUP USE 2139333 GUIDEWIRE UROLOGICAL STR STD 0.035 IN X150 CM REG ZIPWIRE LF

## (undated) DEVICE — SOLUTION IV IRRIG 500ML 0.9% SODIUM CHL 2F7123

## (undated) DEVICE — INVIEW CLEAR LEGGINGS: Brand: CONVERTORS

## (undated) DEVICE — STONE RETRIEVAL BASKET: Brand: SEGURA HEMISPHERE

## (undated) DEVICE — CYSTO/BLADDER IRRIGATION SET, REGULATING CLAMP

## (undated) DEVICE — DBD-PACK,CYSTOSCOPY,PK VI,AURORA: Brand: MEDLINE

## (undated) DEVICE — GAUZE,SPONGE,4"X4",8PLY,STRL,LF,10/TRAY: Brand: MEDLINE

## (undated) DEVICE — CANNULA NSL 13FT TUBE AD ETCO2 DIV SAMP M

## (undated) DEVICE — OPEN-END FLEXI-TIP URETERAL CATHETER: Brand: FLEXI-TIP

## (undated) DEVICE — GOWN SIRUS NONREIN LG W/TWL: Brand: MEDLINE INDUSTRIES, INC.

## (undated) DEVICE — GLOVE ORANGE PI 7 1/2   MSG9075

## (undated) DEVICE — PREP SOL PVP IODINE 4%  4 OZ/BTL

## (undated) DEVICE — Z DUP USE 2522782 SOLUTION IRRIG 1000ML STRL H2O PLAS CONTAINER UROMATIC

## (undated) DEVICE — BAG URIN STRL FOR URO CTCH SYS

## (undated) DEVICE — CIRCUIT ANES L72IN 3L BACT AND VIR FLTR EL CONN SGL LIMB

## (undated) DEVICE — MEDI-VAC NON-CONDUCTIVE SUCTION TUBING: Brand: CARDINAL HEALTH